# Patient Record
Sex: MALE | Race: BLACK OR AFRICAN AMERICAN | NOT HISPANIC OR LATINO | Employment: OTHER | URBAN - METROPOLITAN AREA
[De-identification: names, ages, dates, MRNs, and addresses within clinical notes are randomized per-mention and may not be internally consistent; named-entity substitution may affect disease eponyms.]

---

## 2018-04-26 DIAGNOSIS — M48.02 STENOSIS, CERVICAL SPINE: Primary | ICD-10-CM

## 2018-04-26 DIAGNOSIS — M48.061 SPINAL STENOSIS, LUMBAR: ICD-10-CM

## 2018-06-11 ENCOUNTER — CLINICAL SUPPORT (OUTPATIENT)
Dept: REHABILITATION | Facility: HOSPITAL | Age: 53
End: 2018-06-11
Payer: MEDICARE

## 2018-06-11 DIAGNOSIS — G89.29 CHRONIC BILATERAL LOW BACK PAIN WITHOUT SCIATICA: ICD-10-CM

## 2018-06-11 DIAGNOSIS — M53.86 DECREASED RANGE OF MOTION OF LUMBAR SPINE: ICD-10-CM

## 2018-06-11 DIAGNOSIS — M54.50 CHRONIC BILATERAL LOW BACK PAIN WITHOUT SCIATICA: ICD-10-CM

## 2018-06-11 DIAGNOSIS — M62.81 WEAKNESS OF TRUNK MUSCULATURE: ICD-10-CM

## 2018-06-11 PROCEDURE — G8978 MOBILITY CURRENT STATUS: HCPCS | Mod: CL,PN

## 2018-06-11 PROCEDURE — G8979 MOBILITY GOAL STATUS: HCPCS | Mod: CK,PN

## 2018-06-11 PROCEDURE — 97161 PT EVAL LOW COMPLEX 20 MIN: CPT | Mod: PN

## 2018-06-11 PROCEDURE — 97110 THERAPEUTIC EXERCISES: CPT | Mod: PN

## 2018-06-11 NOTE — PROGRESS NOTES
[facility] - PHYSICAL THERAPY EVALUATION     Name: Daniel Chapa  Clinic Number: [id number]    Daniel is a 53 y.o. male evaluated on 06/11/2018.   Time In: 8:00 am  Time out: 9:30 am    Diagnosis:   Encounter Diagnoses   Name Primary?    Chronic bilateral low back pain without sciatica     Weakness of trunk musculature     Decreased range of motion of lumbar spine      Physician: Ramos Lisa FNP  Treatment Orders: PT Eval and Treat    Past Medical History:   Diagnosis Date    Back pain     Diabetes mellitus     Hyperlipemia     Hyperlipidemia     Hypertension      Current Outpatient Prescriptions   Medication Sig    acyclovir 5% (ZOVIRAX) 5 % ointment Apply topically every 3 (three) hours.    aspirin 81 MG Chew Take 81 mg by mouth once daily.      hydrocodone-acetaminophen (LORTAB)  mg per tablet Take 1 tablet by mouth every 6 (six) hours as needed.      lactulose (CHRONULAC) 10 gram/15 mL solution Take by mouth 3 (three) times daily.    metformin (GLUCOPHAGE) 1000 MG tablet Take 1,000 mg by mouth 2 (two) times daily with meals.      oxycodone-acetaminophen (PERCOCET) 5-325 mg per tablet Take 1 tablet by mouth every 4 (four) hours as needed for Pain.    simvastatin (ZOCOR) 20 MG tablet Take 20 mg by mouth every evening.      valsartan (DIOVAN) 80 MG tablet Take 80 mg by mouth once daily.       No current facility-administered medications for this visit.      Review of patient's allergies indicates:  No Known Allergies  Precautions: None     Pattern of pain determined: 1 PEN  Visit # authorized:   Authorization period:   Plan of care Expiration: 9/11/2018  To Vendor Referred By By Location/POS By Department   none [facility] LOCATION ([facility]) [facility]   Priority Start Date Expiration Date Referral Entered By   Routine 04/26/2018 04/26/2019 Kamini Sellers   Visits Requested Visits Authorized Visits Completed Visits Scheduled   1 1 1 0            HISTORY   History of Present Illness: Chronic lower back pain. Onset over 10 to 15 years ago. Pt ambulates with lower back and cervical pain. Pt states back pain hurts more than neck pain. Pt would like to state work on lower back first. KOFI: Pt states he had an accident at work, which hurt  his lower back. Pt denies any bowel and bladder movement changes.Pt denies any fall.  Pt states lower back pain is localized cross lower back and radiates down to both lower extremity. Pt states pain radiates down his legs is intermittent.  Pt describe pain as aching and sharp pain. Pt states he wakes up at night because of his back pian.  Aggravating factors: standing too long, sitting down too long, lying down too long, bending forward, stairs., house shores. Easing Factors: pain medications.     Diagnostic Tests: From EPIC None    Pain Scale: Will rates pain on a scale of 0-10 to be 10 at worst; 6 currently; 4 at best using VAS.   Pain location: lower back and radiates down to B LE.     Aggravating factors: see above  Easing Factors: see above   Disturbed Sleep: Yes    Pattern of pain questions:  1.  Where is your pain the worst? Lower   2.  Is your pain constant or intermittent?   3.  Does bending forward make your typical pain worse? No  4.  Since the start of your back pain, has there been a change in your bowel or bladder? Yes  5.  What can't you do now that you use to be able to do?  granddaughter and house shores and sexual life.    Prior Treatment: Yes   Prior functional status: Independent   DME owned/used: No  Occupation: Retired   Leisure: Hang out with family            Pts goals:  Decrease lower back pain.     Red Flag Screening:   Cough  Sneeze  Strain: (--)  Bladder/ bowel: (--)  Falls: (--)  Night pain: (--)  Unexplained weight loss: (--)  General health: Left knee scope. HTN     OBJECTIVE     BMI: 45.61    Postural examination/scapula alignment: Rounded shoulder, Abnormal trunk flexion and  Slouched posture  Sitting: Rounded shoulder, Abnormal trunk flexion and Slouched posture  Standing: Rounded shoulder, Abnormal trunk flexion and Slouched posture  Correction of posture: better with lumbar roll    MOVEMENT LOSS    ROM Loss   Flexion major loss 22 degrees*   Extension major loss 10 degrees *   Side bending Right major loss 17 degrees *   Side bending Left major loss 19 degrees *   Rotation Right minimal loss*   Rotation Left minimal loss*   * Pain  Lower Extremity Strength  Right LE  Left LE    Hip flexion: 4+/5 Hip flexion: 4+/5   Hip extension:  4+/5 Hip extension: 4+/5   Hip abduction: 4+/5 Hip abduction: 4+/5   Hip adduction:  4+/5 Hip adduction:  4+/5   Hip Internal rotation   4+/5 Hip Internal rotation 4+/5   Knee Flexion 4+/5 Knee Flexion 4+/5   Knee Extension 4+/5 Knee Extension 4+/5   Ankle dorsiflexion: 4+/5 Ankle dorsiflexion: 4+/5   Ankle plantarflexion: 4+/5 Ankle plantarflexion: 4+/5       GAIT:  Assistive Device used: none  Level of Assistance: independent  Patient displays the following gait deviations:  no gait deviations observed.     Special Tests:   Test Name  Test Result   Prone Instability Test (--)   SI Joint Provocation Test (--)   Straight Leg Raise (+)   Neural Tension Test (+)   Crossed Straight Leg Raise (--)   Walking on toes (--)   Walking on heels  (--)       NEUROLOGICAL SCREENING     Sensory deficit: Intact lower extremity     Reflexes:    Left Right   Patella Tendon 2+ 2+   Achilles Tendon 2+ 2+   Babinski  (--) (--)   Clonus (--) (--)     REPEATED TEST MOVEMENTS:  Repeated Flexion in Standing pain during motion   Repeated Extension in Standing end range pain  pain during motion  worse   Repeated Flexion in lying pain during motion   Repeated Extension in lying  end range pain  pain during motion  worse       STATIC TESTS   Sitting slouched  pain during motion   Sitting erect pain during motion   Standing slouched pain during motion   Standing erect  pain during  motion   Lying prone in extension  end range pain  pain during motion   Long sitting   pain during motion       Baseline Isometric Testing on Med X equipment: Testing administered by PT  Date of testin2018   ROM 12-24 deg   Max Peak Torque 152   Min Peak Torque 112   Flex/Ext Ratio 0.1   % below normative data -40%   Counter weight 646   femur 5   Seat pad 0     CMS Impairment/Limitation/Restriction for FOTO Lumbar Spine Survey  Status Limitation G-Code CMS Severity Modifier  Intake 40% 60% Current Status CL - At least 60 percent but less than 80 percent  Predicted 52% 48% Goal Status+ CK - At least 40 percent but less than 60 percent  D/C Status CL **only report if this is a one time visit  +Based on FOTO predicted change score    Score interpretation is as follows:     TEST SCORE  Modifier  Impairment Limitation Restriction    0/50  CH  0 % impaired, limited or restricted   1-9/50  CI  @ least 1% but less than 20% impaired, limited or restricted   10-19/50  CJ  @ least 20%<40% impaired, limited or restricted   20-29/50  CK  @ least 40%<60% impaired, limited or restricted   30-39/50  CL  @ least 60% <80% impaired, limited or restricted   40-49/50  CM  @ least 80%<100% impaired limited or restricted   50/50  CN  100% impaired, limited or restricted       Treatment   Time In: 8:50  Time Out: 9:30    PT Evaluation Completed? Yes  Discussed Plan of Care with patient: Yes    [facility] 2018   Visit Number 1   VAS Pain Rating 6   Lumbar Extension Seat Pad 0   Femur Restraint 5   Top Dead Center 24   Counterweight 646   Lumbar Flexion 24   Lumbar Extension 12   Lumbar Peak Torque 152   Min Torque 112   Percent From Norm -40   Ice - Z Lie (in min.) 10         Home Exercise Program as follows:   Handouts were given to the patient. Pt demo good understanding of the education provided. Will demonstrated good return demonstration of activities.     - Patient received education regarding proper posture and  body mechanics.    - Johnny roll tried, recommended, and purchase information was provided.    - Patient received a handout regarding anticipated muscular soreness following the isometric test and strategies for management were reviewed with patient including stretching, using ice and scheduled rest.       Pt was instructed in and performed the following:     Will received therapeutic exercises to develop/improve posture, lumbar/cervical ROM, strength and muscular endurance for  minutes including the following exercises:   Lower trunk rotation  Seated trunk flexion  Single knee to chest    Assessment   This is a 53 y.o. male referred to [facility] and presents with a medical diagnosis of Chronic lower back pain without sciatica and demonstrates limitations as described below in the problem list. Pt rehab potential is Good minus. Pt presents with Chronic lower back pain for over 10 years. Pt presented with chronic lower back pain localized across lower back but intermittent radiating towards B LE. Pt presented with lower back pain increase in lumbar flexion and extension, but trunk extension pain is greater. Pt responded better to lumbar flexion. Pt presented with lower back pain limiting house shores, community life, and sexual life. Pt demonstrated decrease lumbar range of motion and decrease in lumbar extensors musculature. Pt was positive in slump test today. Pt also was positive in SLR test but LE radiating pain is intermittent throughout day. Medx lumbar extension demonstrated decrease in lumbar extensor muscle strength with Max peak torque of 152 and Min peak torque of 112. Pt has Lumbar range of motion of 12 to 24 degrees with lower back pain during motion. Pt did experience lower back pain during Isometric Medx lumbar extension. Pt will benefit from skilled PT services to decrease functional limitations.     Pain Pattern: 1 PEN       Patient received education on the [facility] program, purpose  of the isometric test, progression of back strengthening as well as wellness approach and systemic strengthening.  Details of the program were discussed.  Reviewed that patient should feel support/pressure from med ex restraints but no pain or discomfort and patient expressed understanding.    Based on the above history and physical examination an active physical therapy program is recommended.  Pt will continue to benefit from skilled outpatient physical therapy to address the deficits listed below in the chart, provide pt/family education and to maximize pt's level of independence in the home and community environment. .     No environmental, cultural, spiritual, developmental or education needs expressed or noted    Medical necessity is demonstrated by the following problem list.    Pt presents with the following impairments:   History  Co-morbidities and personal factors that may impact the plan of care Examination  Body Structures and Functions, activity limitations and participation restrictions that may impact the plan of care Clinical Presentation   Decision Making/ Complexity Score   Co-morbidities:   high BMI        Personal Factors:   no deficits Body Regions:   back    Body Systems:   ROM  strength  transfers  transitions  motor control    Activity limitations:   Learning and applying knowledge  no deficits    General Tasks and Commands  no deficits    Communication  no deficits    Mobility  lifting and carrying objects  walking    Self care  no deficits    Domestic Life  doing house work (cleaning house, washing dishes, laundry)    Interactions/Relationships  no deficits    Life Areas  no deficits      Participation Restrictions:   Community life  recreation and leisure       stable and uncomplicated   Complexity: low  See FOTO outcome assessment above          GOALS: Pt is in agreement with the following goals.    Short term goals:  6 weeks or 10 visits   1.  Pt will demonstrate increased lumbar ROM by  "at least 3 degrees from the initial ROM value with improvements noted in functional ROM and ability to perform ADLs  2.  Pt will demonstrate increased maximum isometric torque value by 5% when compared to the initial value resulting in improved ability to perform bending, lifting, and carrying activities safely, confidently.  3.  Patient report a reduction in worst pain score by 1-2 points for improved tolerance during work and recreational activities  4.  Pt able to perform HEP correctly with minimal cueing or supervision for therapist      Long term goals: 13 weeks or 20 visits   1. Pt will demonstrate increased lumbar ROM by at least 10 degrees from initial ROM value, resulting in improved ability to perform functional fwd bending while standing and sitting.   2. Pt will demonstrate increased maximum isometric torque value by 10% when compared to the initial value resulting in improved ability to perform bending, lifting, and carrying activities safely, confidently.  3. Pt to demonstrate ability to independently control and reduce their pain through posture positioning and mechanical movements throughout a typical day.  4.  Patient will demonstrate improved overall function per FOTO Survey to CK = at least 40% but < 60% impaired, limited or restricted score or less.      Plan   Outpatient physical therapy 2x week for 13 weeks or 20 visits to include the following:   - Patient education  - Therapeutic exercise  - Manual therapy  - Performance testing   - Neuromuscular Re-education  - Therapeutic activity   - Modalities  -Functional dry needling     Pt may be seen by PTA as part of the rehabilitation team.     Therapist: Mikal Alexander, PT  6/11/2018    "I certify the need for these services furnished under this plan of treatment and while under my care."    ____________________________________  Physician/Referring Practitioner    _______________  Date of Signature                 "

## 2018-06-15 NOTE — PROGRESS NOTES
[facility] Physical Therapy Treatment      Name: Daniel Chapa  Clinic Number: [id number]  Diagnosis:   Encounter Diagnoses   Name Primary?    Chronic bilateral low back pain without sciatica     Weakness of trunk musculature     Decreased range of motion of lumbar spine      Physician: Ramos Lisa FNP  Precautions: Standard   Visit #: 2 of 12  COBURN: 2018  PTA Visit #: 0    Time In: 8:30 am  Time Out: 9:35 am  Total Treatment Time: 65  mins (1:1 with PT for 30 mins)    Pain Pattern: 1 PEN  Date POC Signed: Fax to [facility]        Subjective     Will reports worsening of symptoms.  Pt states that he has severe pain in right and left lower back.     Patient reports their pain to be 6 out of 10 on a 0-10 scale with 0 being no pain and 10 being the worst pain imaginable.    Pain Location: lower back      Objective     Baseline Isometric Testing on Med X equipment: Testing administered by PT  Date of testin2018   ROM 12-24 deg   Max Peak Torque 152   Min Peak Torque 112   Flex/Ext Ratio 0.1   % below normative data -40%   Counter weight 646   femur 5   Seat pad 0        Treatment      Will received individual therapeutic exercises to develop strength, endurance, ROM, flexibility, posture and core stabilization for 55 minutes including:      [facility] 2018   Visit Number 2   VAS Pain Rating 6   Treadmill Time (in min.) 10   Speed 1   Incline 0   Lumbar Extension Seat Pad -   Femur Restraint -   Top Dead Center -   Counterweight -   Lumbar Flexion 24   Lumbar Extension 12   Lumbar Peak Torque -   Min Torque -   Percent From Norm -   Lumbar Weight 45   Repetitions 15   Rating of Perceived Exertion 5   Ice - Z Lie (in min.) 10     Seated trunk flexion 1x10  SKTC 3x30''   LTR1x2'    Peripheral muscle strengthening which included 1 set of 15-20 repetitions at a slow, controlled 7 second per rep pace focused on strengthening supporting musculature for improved body mechanics  and functional mobility. Pt and therapist focused on proper form during treatment to ensure optimal strengthening of each targeted muscle group. Machines were utilized including torso rotation, leg extension, leg curl, chest press, upright row, tricep extension, bicep curl, leg press, and hip abduction.  Lower extremity exercises not performed today.     Will received the following manual therapy techniques: Soft tissue Mobilization were applied to the: N/a for  00 minutes including:    Kinesio tape applied to lower back with Biofreeze  For decrease pain  using T-strip . Patient received education regarding appropriate care and removal of Kinesiotape. Patient instructed in proper removal techniques if skin irritation occurs.       Written Home Exercises Provided:   Pt demo good understanding of the education provided. Will demonstrated good return demonstration of activities.     Education provided re:   Will verbalized good understanding of education provided.   No spiritual or educational barriers to learning provided    Lumbar roll use compliance: better with lumbar roll    Assessment     Patient tolerated therapy Fair today. Pt presented with severe right and left lower back pain. Pt's pain increase with side bending to right side. Pt tolerated initial medx lumbar extension Fair with ROM of 12-24 degrees, weight of 45 ft lbs,15 reps and RPE of 5. Pt did demonstrated physical expression of pain. Pt tolerated initial peripheral muscle strengthening Fair today. Pt cont demonstrated pain in B lower back. Pt tolerates all upper extremity exercises with 15 reps and RPE of 5,6, or 7. Lower extremity exercises were not performed today. Taping technique with Biofreeze was applied in lower back to help decrease pain. Cont skilled PT services to achieve PT and patient's goals.   This is a 53 y.o. male referred to outpatient physical therapy and presents with a medical diagnosis of Chronic lower back pain without sciatica   and demonstrates limitations as described in the problem list. Pt prognosis is Good minus.Pt will continue to benefit from skilled outpatient physical therapy to address the deficits listed in the problem list, provide pt/family education and to maximize pt's level of independence in the home and community environment.     Goals as follows:  Short term goals:  6 weeks or 10 visits   1.  Pt will demonstrate increased lumbar ROM by at least 3 degrees from the initial ROM value with improvements noted in functional ROM and ability to perform ADLs  2.  Pt will demonstrate increased maximum isometric torque value by 5% when compared to the initial value resulting in improved ability to perform bending, lifting, and carrying activities safely, confidently.  3.  Patient report a reduction in worst pain score by 1-2 points for improved tolerance during work and recreational activities  4.  Pt able to perform HEP correctly with minimal cueing or supervision for therapist        Long term goals: 13 weeks or 20 visits   1. Pt will demonstrate increased lumbar ROM by at least 10 degrees from initial ROM value, resulting in improved ability to perform functional fwd bending while standing and sitting.   2. Pt will demonstrate increased maximum isometric torque value by 10% when compared to the initial value resulting in improved ability to perform bending, lifting, and carrying activities safely, confidently.  3. Pt to demonstrate ability to independently control and reduce their pain through posture positioning and mechanical movements throughout a typical day.  4.  Patient will demonstrate improved overall function per FOTO Survey to CK = at least 40% but < 60% impaired, limited or restricted score or less.       Plan   Plan to schedule more visits.    Continue with established Plan of Care towards established PT goals.     Certification Period:6/11/2018 to 9/11/2018    Therapist: Mikal Alexander, PT  06/18/2018

## 2018-06-18 ENCOUNTER — CLINICAL SUPPORT (OUTPATIENT)
Dept: REHABILITATION | Facility: HOSPITAL | Age: 53
End: 2018-06-18
Payer: MEDICARE

## 2018-06-18 DIAGNOSIS — G89.29 CHRONIC BILATERAL LOW BACK PAIN WITHOUT SCIATICA: ICD-10-CM

## 2018-06-18 DIAGNOSIS — M54.50 CHRONIC BILATERAL LOW BACK PAIN WITHOUT SCIATICA: ICD-10-CM

## 2018-06-18 DIAGNOSIS — M62.81 WEAKNESS OF TRUNK MUSCULATURE: ICD-10-CM

## 2018-06-18 DIAGNOSIS — M53.86 DECREASED RANGE OF MOTION OF LUMBAR SPINE: ICD-10-CM

## 2018-06-18 PROCEDURE — 97110 THERAPEUTIC EXERCISES: CPT | Mod: PN

## 2018-06-21 ENCOUNTER — CLINICAL SUPPORT (OUTPATIENT)
Dept: REHABILITATION | Facility: HOSPITAL | Age: 53
End: 2018-06-21
Payer: MEDICARE

## 2018-06-21 DIAGNOSIS — M53.86 DECREASED RANGE OF MOTION OF LUMBAR SPINE: ICD-10-CM

## 2018-06-21 DIAGNOSIS — G89.29 CHRONIC BILATERAL LOW BACK PAIN WITHOUT SCIATICA: ICD-10-CM

## 2018-06-21 DIAGNOSIS — M62.81 WEAKNESS OF TRUNK MUSCULATURE: ICD-10-CM

## 2018-06-21 DIAGNOSIS — M54.50 CHRONIC BILATERAL LOW BACK PAIN WITHOUT SCIATICA: ICD-10-CM

## 2018-06-21 PROCEDURE — 97110 THERAPEUTIC EXERCISES: CPT | Mod: PN | Performed by: PHYSICAL MEDICINE & REHABILITATION

## 2018-06-21 NOTE — PATIENT INSTRUCTIONS
Backward Bend (Standing)        Arch backward to make hollow of back deeper. Hold __3__ seconds.  Repeat __10__ times per set. Do ____ sets per session. Do _2___ sessions per day.     [email or web address]

## 2018-06-21 NOTE — PROGRESS NOTES
[facility]      Name: Daniel Chapa  Clinic Number: [id number]  Diagnosis:   Encounter Diagnoses   Name Primary?    Chronic bilateral low back pain without sciatica     Weakness of trunk musculature     Decreased range of motion of lumbar spine      Physician: Ramos Lisa FNP  Precautions: Standard   Visit #: 3 of 12  COBURN: 2018  PTA Visit #: 0    Time In: 8:30 am  Time Out: 9:35 am  Total Treatment Time: 65  mins (1:1 with PT for 30 mins)    Pain Pattern: 1 PEN  Date POC Signed: Fax to KEYUR Juarez        Subjective     Will reports he was sore after his last visit for a day. He attends with 7/10 back pain, 4/10 after visit.  He initially appeared fearful of movement and we discussed the traffic light model of exercise, stressing we were looking for green lights,movements that made his pain better, but yellow lights, discomfort or stretching with no worsening of symptoms after is ok.  He appeared to understand this and be more comfortable moving.   He had less pain after visit. He liked the tape and we reapplied it today.          Patient reports their pain to be 6 out of 10 on a 0-10 scale with 0 being no pain and 10 being the worst pain imaginable.    Pain Location: lower back      Objective     Baseline Isometric Testing on Med X equipment: Testing administered by PT  Date of testin2018   ROM 12-24 deg   Max Peak Torque 152   Min Peak Torque 112   Flex/Ext Ratio 0.1   % below normative data -40%   Counter weight 646   femur 5   Seat pad 0        Treatment      Will received individual therapeutic exercises to develop strength, endurance, ROM, flexibility, posture and core stabilization for 55 minutes including:      Seated trunk flexion 1x10  SKTC 3x30''   LTR1x2'    Gentle ext in standing without increase in pain, added to HEP      [facility] 2018   Visit Number 3   VAS Pain Rating 7   Treadmill Time (in min.) 10   Speed 1.5   Incline -    Extension in Standing 10   Flexion in Lying 10   Lumbar Extension Seat Pad -   Femur Restraint -   Top Dead Center -   Counterweight -   Lumbar Flexion -   Lumbar Extension -   Lumbar Peak Torque -   Min Torque -   Percent From Norm -   Lumbar Weight 45   Repetitions 20   Rating of Perceived Exertion 6   Ice - Z Lie (in min.) 10         Peripheral muscle strengthening which included 1 set of 15-20 repetitions at a slow, controlled 7 second per rep pace focused on strengthening supporting musculature for improved body mechanics and functional mobility. Pt and therapist focused on proper form during treatment to ensure optimal strengthening of each targeted muscle group. Machines were utilized including torso rotation, leg extension, leg curl, chest press, upright row, tricep extension, bicep curl, leg press, and hip abduction.  Lower extremity exercises not performed today.     Will received the following manual therapy techniques: Soft tissue Mobilization were applied to the: N/a for  00 minutes including:    Kinesio tape applied to lower back with Biofreeze  For decrease pain  using T-strip . Patient received education regarding appropriate care and removal of Kinesiotape. Patient instructed in proper removal techniques if skin irritation occurs.       Written Home Exercises Provided:   Double knees to chest  Lower trunk rotation  Seated trunk flexion  Single knee to chest   ext in standing  Pt demo good understanding of the education provided. Will demonstrated good return demonstration of activities.     Education provided re:   Will verbalized good understanding of education provided.   No spiritual or educational barriers to learning provided    Lumbar roll use compliance: better with lumbar roll but not using, encouraged  HEP additions today:  Gentle ext in standing without increase in pain, added to HEP        Assessment     Patient tolerated therapy Fair today. Pt presented with  right and left lower back  pain. Pt's pain increase with side bending to right side but didn't stay worse.  Educated him in green lights and we are looking for movements and positions which reduce pain, which flexion seems to assist slightly.  No worse with gentle ext in standing which was added to improve mobility and function. Pt tolerated initial medx lumbar extension Fair with ROM of 12-24 degrees, weight of 45 ft lbs, 20 reps and RPE of 6. Pt did not indicate pain today on med ex and seemed more comfortable moving, when discussed that motion is normal, emphasizing that we want to move in ranges that don't increase symptoms after.   He appeared to understand and relax, will pay attention to this in future visits as he appears to have some fear regarding movement.   Pt tolerated initial peripheral muscle strengthening Fair today. Pt cont demonstrated pain in B lower back.  Taping technique with Biofreeze was applied in lower back to help decrease pain. Cont skilled PT services to achieve PT and patient's goals.   This is a 53 y.o. male referred to outpatient physical therapy and presents with a medical diagnosis of Chronic lower back pain without sciatica  and demonstrates limitations as described in the problem list. Pt prognosis is Good minus.Pt will continue to benefit from skilled outpatient physical therapy to address the deficits listed in the problem list, provide pt/family education and to maximize pt's level of independence in the home and community environment.     Goals as follows:  Short term goals:  6 weeks or 10 visits   1.  Pt will demonstrate increased lumbar ROM by at least 3 degrees from the initial ROM value with improvements noted in functional ROM and ability to perform ADLs  2.  Pt will demonstrate increased maximum isometric torque value by 5% when compared to the initial value resulting in improved ability to perform bending, lifting, and carrying activities safely, confidently.  3.  Patient report a reduction in  worst pain score by 1-2 points for improved tolerance during work and recreational activities  4.  Pt able to perform HEP correctly with minimal cueing or supervision for therapist        Long term goals: 13 weeks or 20 visits   1. Pt will demonstrate increased lumbar ROM by at least 10 degrees from initial ROM value, resulting in improved ability to perform functional fwd bending while standing and sitting.   2. Pt will demonstrate increased maximum isometric torque value by 10% when compared to the initial value resulting in improved ability to perform bending, lifting, and carrying activities safely, confidently.  3. Pt to demonstrate ability to independently control and reduce their pain through posture positioning and mechanical movements throughout a typical day.  4.  Patient will demonstrate improved overall function per FOTO Survey to CK = at least 40% but < 60% impaired, limited or restricted score or less.       Plan   Plan to schedule more visits.    Continue with established Plan of Care towards established PT goals.     Certification Period:6/11/2018 to 9/11/2018    Therapist: Arianna Chase, PT  06/18/2018

## 2018-06-28 ENCOUNTER — CLINICAL SUPPORT (OUTPATIENT)
Dept: REHABILITATION | Facility: HOSPITAL | Age: 53
End: 2018-06-28
Payer: MEDICARE

## 2018-06-28 DIAGNOSIS — M62.81 WEAKNESS OF TRUNK MUSCULATURE: ICD-10-CM

## 2018-06-28 DIAGNOSIS — G89.29 CHRONIC BILATERAL LOW BACK PAIN WITHOUT SCIATICA: ICD-10-CM

## 2018-06-28 DIAGNOSIS — M53.86 DECREASED RANGE OF MOTION OF LUMBAR SPINE: ICD-10-CM

## 2018-06-28 DIAGNOSIS — M54.50 CHRONIC BILATERAL LOW BACK PAIN WITHOUT SCIATICA: ICD-10-CM

## 2018-06-28 PROCEDURE — 97140 MANUAL THERAPY 1/> REGIONS: CPT | Mod: PN

## 2018-06-28 PROCEDURE — 97110 THERAPEUTIC EXERCISES: CPT | Mod: PN

## 2018-06-28 NOTE — PROGRESS NOTES
" [facility]      Name: Daniel Chapa  Clinic Number: [id number]  Diagnosis:   Encounter Diagnoses   Name Primary?    Chronic bilateral low back pain without sciatica     Weakness of trunk musculature     Decreased range of motion of lumbar spine      Physician: Ramos Lisa FNP  Precautions: Standard   Visit #: 4 of 12  COBURN: 2018  PTA Visit #: 0    Time In: 8:30 am  Time Out: 9:35 am  Total Treatment Time: 65  mins (1:1 with PT for 40 mins)    Pain Pattern: 1 PEN  Date POC Signed: Fax to KEYUR Juarez      Subjective     Will reports that he has been doing his HEP daily but notes some relief of symptoms afterwards. "I really like the tape. Can I get it again? It helps when it's on, but I hurt more when it's off."    Patient reports their pain to be 5 out of 10 on a 0-10 scale with 0 being no pain and 10 being the worst pain imaginable.    Pain Location: lower back      Objective     Baseline Isometric Testing on Med X equipment: Testing administered by PT  Date of testin2018   ROM 12-24 deg   Max Peak Torque 152   Min Peak Torque 112   Flex/Ext Ratio 0.1   % below normative data -40%   Counter weight 646   femur 5   Seat pad 0        Treatment      Will received individual therapeutic exercises to develop strength, endurance, ROM, flexibility, posture and core stabilization for 55 minutes including:    Seated trunk flexion 1x10  SKTC 3x30''   LTR1x2'    Gentle ext in standing without increase in pain, added to HEP    [facility] 2018   Visit Number 3   VAS Pain Rating 5   Treadmill Time (in min.) 10   Speed 1.5   Incline -   Extension in Standing 10   Flexion in Lying 10   Manual Therapy 10 - see note below   Lumbar Weight 45   Repetitions 20   Rating of Perceived Exertion 6.5   Ice - Z Lie (in min.) 10       Peripheral muscle strengthening which included 1 set of 15-20 repetitions at a slow, controlled 7 second per rep pace focused on " "strengthening supporting musculature for improved body mechanics and functional mobility. Pt and therapist focused on proper form during treatment to ensure optimal strengthening of each targeted muscle group. Machines were utilized including torso rotation, leg extension, leg curl, chest press, upright row, tricep extension, bicep curl, leg press, and hip abduction.  Lower extremity exercises not performed today.     Will received the following manual therapy techniques: 10 min x preparation and application of kinesiotape for pain reduction, muscle inhibition (LSP paraspinals, QL), and improvement of MF mobility. Used 4 I-strips for paraspinal and QLs bilaterally. Patient received education regarding appropriate care and removal of Kinesiotape. Patient instructed in proper removal techniques if skin irritation occurs. Taping concurrent with Biofreeze application for pain control.      Written Home Exercises Provided:   Double knees to chest  Lower trunk rotation  Seated trunk flexion  Single knee to chest   ext in standing  Pt demo good understanding of the education provided. Will demonstrated good return demonstration of activities.     Education provided re:   Will verbalized good understanding of education provided.   No spiritual or educational barriers to learning provided    Lumbar roll use compliance: better with lumbar roll but not using, encouraged  HEP additions today:  Gentle ext in standing without increase in pain, added to HEP        Assessment     Minimal change to theres program today, including resisted trunk extension, due to c/o marked low back pain. Pt reports intense "pulling" in bilateral lower back (6/10 on RPE scale) during trunk extension in modified ROM (12-24 deg). Pt presents with flat back posture, with poor MF mobility during trunk flexion, with associated c/o increased pain. Pt would be a good candidate for MF release using cupping. Heavy education today on importance of performing " HEP frequently throughout the day to reduce c/o pain and improve functional mobility. Pt verbalized understanding. Pt cont demonstrated pain in B lower back.  Taping technique with Biofreeze was applied in lower back to help decrease pain. Cont skilled PT services to achieve PT and patient's goals.     This is a 53 y.o. male referred to outpatient physical therapy and presents with a medical diagnosis of Chronic lower back pain without sciatica  and demonstrates limitations as described in the problem list. Pt prognosis is Good minus.Pt will continue to benefit from skilled outpatient physical therapy to address the deficits listed in the problem list, provide pt/family education and to maximize pt's level of independence in the home and community environment.     Goals as follows:  Short term goals:  6 weeks or 10 visits   1.  Pt will demonstrate increased lumbar ROM by at least 3 degrees from the initial ROM value with improvements noted in functional ROM and ability to perform ADLs  2.  Pt will demonstrate increased maximum isometric torque value by 5% when compared to the initial value resulting in improved ability to perform bending, lifting, and carrying activities safely, confidently.  3.  Patient report a reduction in worst pain score by 1-2 points for improved tolerance during work and recreational activities  4.  Pt able to perform HEP correctly with minimal cueing or supervision for therapist        Long term goals: 13 weeks or 20 visits   1. Pt will demonstrate increased lumbar ROM by at least 10 degrees from initial ROM value, resulting in improved ability to perform functional fwd bending while standing and sitting.   2. Pt will demonstrate increased maximum isometric torque value by 10% when compared to the initial value resulting in improved ability to perform bending, lifting, and carrying activities safely, confidently.  3. Pt to demonstrate ability to independently control and reduce their pain  through posture positioning and mechanical movements throughout a typical day.  4.  Patient will demonstrate improved overall function per FOTO Survey to CK = at least 40% but < 60% impaired, limited or restricted score or less.       Plan   Plan to schedule more visits.    Continue with established Plan of Care towards established PT goals.     Certification Period:6/11/2018 to 9/11/2018    Therapist: Delfina Platt, PT  06/18/2018

## 2018-07-11 ENCOUNTER — DOCUMENTATION ONLY (OUTPATIENT)
Dept: REHABILITATION | Facility: HOSPITAL | Age: 53
End: 2018-07-11

## 2018-07-11 NOTE — PROGRESS NOTES
Patient was a no show to today's PT visit. Patient's next scheduled appointment is 7/13/2018.    Cancel: 0  No show: 1    Therapist: Joy Chapin, THEO  07/11/2018

## 2018-07-13 ENCOUNTER — CLINICAL SUPPORT (OUTPATIENT)
Dept: REHABILITATION | Facility: HOSPITAL | Age: 53
End: 2018-07-13
Payer: MEDICARE

## 2018-07-13 DIAGNOSIS — G89.29 CHRONIC BILATERAL LOW BACK PAIN WITHOUT SCIATICA: ICD-10-CM

## 2018-07-13 DIAGNOSIS — M53.86 DECREASED RANGE OF MOTION OF LUMBAR SPINE: ICD-10-CM

## 2018-07-13 DIAGNOSIS — M62.81 WEAKNESS OF TRUNK MUSCULATURE: ICD-10-CM

## 2018-07-13 DIAGNOSIS — M54.50 CHRONIC BILATERAL LOW BACK PAIN WITHOUT SCIATICA: ICD-10-CM

## 2018-07-13 PROCEDURE — 97110 THERAPEUTIC EXERCISES: CPT | Mod: PN

## 2018-07-13 NOTE — PROGRESS NOTES
[facility]      Name: Daniel Chapa  Clinic Number: [id number]  Diagnosis:   Encounter Diagnoses   Name Primary?    Chronic bilateral low back pain without sciatica     Weakness of trunk musculature     Decreased range of motion of lumbar spine      Physician: Ramos Lisa FNP  Precautions: Standard   Visit #: 5  12  COBURN: 2018  PTA Visit #: 1    Time In: 0930  Time Out: 1030  Total Treatment Time: 50 mins (1:1 with PTA for 30 mins)    Pain Pattern: 1 PEN  Date POC Signed: Fax to KEYUR Juarez    Subjective     Will reports that the tape was helping his pain, but that it came off quickly.    Patient reports their pain to be 5 out of 10 on a 0-10 scale with 0 being no pain and 10 being the worst pain imaginable.    Pain Location: lower back    Objective     Baseline Isometric Testing on Med X equipment: Testing administered by PT  Date of testin2018   ROM 12-24 deg   Max Peak Torque 152   Min Peak Torque 112   Flex/Ext Ratio 0.1   % below normative data -40%   Counter weight 646   femur 5   Seat pad 0        Treatment      Will received individual therapeutic exercises to develop strength, endurance, ROM, flexibility, posture and core stabilization for 50 minutes including:    Seated trunk flexion: 10x  SKTC: 3 x 30''   LTR: 10x  ZANE on SB: 10x  Gentle ext in standing x 10     [facility] 2018   Visit Number 5   VAS Pain Rating 5   Treadmill Time (in min.) 10   Speed 2   Incline 0   Extension in Standing 10   Flexion in Lying 10   Flexion in Sitting 10   Manual Therapy 18   Lumbar Weight 45   Repetitions 15   Rating of Perceived Exertion 7   Ice - Z Lie (in min.) 10     Peripheral muscle strengthening which included 1 set of 15-20 repetitions at a slow, controlled 7 second per rep pace focused on strengthening supporting musculature for improved body mechanics and functional mobility. Pt and therapist focused on proper form during treatment to  "ensure optimal strengthening of each targeted muscle group. Machines were utilized including torso rotation, leg extension, leg curl, chest press, upright row, tricep extension, bicep curl, leg press, and hip abduction.  Machines not performed today. See note.    Will received the following manual therapy techniques:   10 min x Vacuum/cupping STM with manual therapy techniques was performed to Right lumbar paraspinals to decrease muscle tightness, increase circulation and promote healing process. The pt's skin was monitored for redness adjusting pressure as needed. The pt was instructed in possible side effects of bruising and/or soreness.   8 min x Rock tape applied to Bilateral lumbar paraspinals for muscle inhibition using 2 - 4" I-strips. Patient received education regarding appropriate care and removal of Kinesiotape. Patient instructed in proper removal techniques if skin irritation occurs. Biofreeze applied to Rock tape for pain reduction.    Written Home Exercises Provided:   Double knees to chest  Lower trunk rotation  Seated trunk flexion  Single knee to chest   ext in standing  Pt demo good understanding of the education provided. Will demonstrated good return demonstration of activities.     Education provided re:   Will verbalized good understanding of education provided.   No spiritual or educational barriers to learning provided    Lumbar roll use compliance: better with lumbar roll but not using, encouraged  HEP additions today:  Gentle ext in standing without increase in pain, added to HEP    Assessment     Patient tolerated treatment session fair today. Patient arrived to session complaining of increased low back pain, Right side > Left. Patient with good tolerance to manual care, however reported no change in subjective complaints. Patient was able to complete his stretches reporting increased pain with visible discomfort observed. Patient was performing [employer] Lumbar Extension machine when he " became emotional and stopped the exercise explaining that he was in a lot of pain and would be unable to continue. Session was ended and patient received ice to his low back for pain reduction in a z-lying position. Patient was encouraged to continue performing his stretches at home for self-management of pain, patient verbal agreement. Cont skilled PT services to achieve PT and patient's goals.     This is a 53 y.o. male referred to outpatient physical therapy and presents with a medical diagnosis of Chronic lower back pain without sciatica  and demonstrates limitations as described in the problem list. Pt prognosis is Good minus.Pt will continue to benefit from skilled outpatient physical therapy to address the deficits listed in the problem list, provide pt/family education and to maximize pt's level of independence in the home and community environment.     Goals as follows:  Short term goals:  6 weeks or 10 visits   1.  Pt will demonstrate increased lumbar ROM by at least 3 degrees from the initial ROM value with improvements noted in functional ROM and ability to perform ADLs  2.  Pt will demonstrate increased maximum isometric torque value by 5% when compared to the initial value resulting in improved ability to perform bending, lifting, and carrying activities safely, confidently.  3.  Patient report a reduction in worst pain score by 1-2 points for improved tolerance during work and recreational activities  4.  Pt able to perform HEP correctly with minimal cueing or supervision for therapist     Long term goals: 13 weeks or 20 visits   1. Pt will demonstrate increased lumbar ROM by at least 10 degrees from initial ROM value, resulting in improved ability to perform functional fwd bending while standing and sitting.   2. Pt will demonstrate increased maximum isometric torque value by 10% when compared to the initial value resulting in improved ability to perform bending, lifting, and carrying activities  safely, confidently.  3. Pt to demonstrate ability to independently control and reduce their pain through posture positioning and mechanical movements throughout a typical day.  4.  Patient will demonstrate improved overall function per FOTO Survey to CK = at least 40% but < 60% impaired, limited or restricted score or less.    Plan     Continue with established Plan of Care towards established PT goals.     Certification Period:6/11/2018 to 9/11/2018    Therapist: Joy Chapin, PTA  07/13/2018

## 2018-07-16 ENCOUNTER — CLINICAL SUPPORT (OUTPATIENT)
Dept: REHABILITATION | Facility: HOSPITAL | Age: 53
End: 2018-07-16
Payer: MEDICARE

## 2018-07-16 DIAGNOSIS — G89.29 CHRONIC BILATERAL LOW BACK PAIN WITHOUT SCIATICA: ICD-10-CM

## 2018-07-16 DIAGNOSIS — M54.50 CHRONIC BILATERAL LOW BACK PAIN WITHOUT SCIATICA: ICD-10-CM

## 2018-07-16 DIAGNOSIS — M62.81 WEAKNESS OF TRUNK MUSCULATURE: ICD-10-CM

## 2018-07-16 DIAGNOSIS — M53.86 DECREASED RANGE OF MOTION OF LUMBAR SPINE: ICD-10-CM

## 2018-07-16 PROCEDURE — 97110 THERAPEUTIC EXERCISES: CPT | Mod: PN | Performed by: PHYSICAL MEDICINE & REHABILITATION

## 2018-07-16 NOTE — PROGRESS NOTES
[facility]      Name: Daniel Chapa  Clinic Number: [id number]  Diagnosis:   Encounter Diagnoses   Name Primary?    Chronic bilateral low back pain without sciatica     Weakness of trunk musculature     Decreased range of motion of lumbar spine      Physician: Ramos Lisa FNP  Precautions: Standard   Visit #:   COBURN: 2018  PTA Visit #: 1    Time In: 0930  Time Out: 1030  Total Treatment Time: 50 mins (1:1 with PTA for 30 mins)    Pain Pattern: 1 PEN  Date POC Signed: Fax to KEYUR Juarez    Subjective     Will reports that the tape was helping his pain, he is not stretching every day but trying to be more consistent.  Reminded him to take ownership of symptoms, motion is lotion, stretch daily and watch posture.  Pain 5/10 back pre treatment, 2/10 post treatment, reminded him how he felt better after moving and to continue this.    Patient reports their pain to be 5 out of 10 on a 0-10 scale with 0 being no pain and 10 being the worst pain imaginable.    Pain Location: lower back    Objective     MOVEMENT LOSS     ROM Loss   Flexion major loss 22 degrees*  Improved to mod loss   Extension major loss 10 degrees * improved to mod loss   Side bending Right major loss 17 degrees *   Side bending Left major loss 19 degrees *   Rotation Right minimal loss*   Rotation Left minimal loss*       Baseline Isometric Testing on Med X equipment: Testing administered by PT  Date of testin2018   ROM 12-24 deg   Max Peak Torque 152   Min Peak Torque 112   Flex/Ext Ratio 0.1   % below normative data -40%   Counter weight 646   femur 5   Seat pad 0        CMS Impairment/Limitation/Restriction for FOTO Lumbar Spine Survey  Status Limitation G-Code CMS Severity Modifier  Intake  60% Current Status CL - At least 60 percent but less than 80 percent  Goal CK      Treatment      Will received individual therapeutic exercises to develop strength, endurance, ROM, flexibility,  "posture and core stabilization for 50 minutes including:    Seated trunk flexion: 10x  SKTC: 3 x 30''   LTR: 10x  ZANE on SB: 10x  Gentle ext in standing x 10       [facility] 7/16/2018   Visit Number 6   VAS Pain Rating 5   Treadmill Time (in min.) 10   Speed 2   Incline -   Extension in Standing 10   Flexion in Lying 10   Flexion in Sitting 10   Manual Therapy 10   Lumbar Extension Seat Pad -   Femur Restraint -   Top Dead Center -   Counterweight -   Lumbar Flexion 30   Lumbar Extension 6   Lumbar Peak Torque -   Min Torque -   Percent From Norm -   Lumbar Weight 45   Repetitions 17   Rating of Perceived Exertion 6   Ice - Z Lie (in min.) 10           Peripheral muscle strengthening which included 1 set of 15-20 repetitions at a slow, controlled 7 second per rep pace focused on strengthening supporting musculature for improved body mechanics and functional mobility. Pt and therapist focused on proper form during treatment to ensure optimal strengthening of each targeted muscle group. Machines were utilized including torso rotation, leg extension, leg curl, chest press, upright row, tricep extension, bicep curl, leg press, and hip abduction.  Machines not performed today. See note.    Will received the following manual therapy techniques:   10 min x Vacuum/cupping STM with manual therapy techniques was performed to Right lumbar paraspinals to decrease muscle tightness, increase circulation and promote healing process. The pt's skin was monitored for redness adjusting pressure as needed. The pt was instructed in possible side effects of bruising and/or soreness.   8 min x Rock tape applied to Bilateral lumbar paraspinals for muscle inhibition using 2 - 4" I-strips. Patient received education regarding appropriate care and removal of Kinesiotape. Patient instructed in proper removal techniques if skin irritation occurs. Biofreeze applied to Rock tape for pain reduction.    Written Home Exercises Provided: "   Double knees to chest  Lower trunk rotation  Seated trunk flexion  Single knee to chest   ext in standing  Pt demo good understanding of the education provided. Will demonstrated good return demonstration of activities.     Education provided re:   Will verbalized good understanding of education provided.   No spiritual or educational barriers to learning provided    Lumbar roll use compliance: better with lumbar roll but not using, encouraged  HEP additions today:  Gentle ext in standing without increase in pain, added to HEP    Assessment     Patient tolerated treatment session fair today.  Assessment demonstrates slowly improving ROM.  He responds well to taping and cupping.  Encouraged improved compliance with stretching. Patient arrived to session complaining of increased low back pain, Right side > Left. Patient with good tolerance to manual care.  He tolerated improved ROM on med ex and responded well to exercise today, but requires cuing that motion is normal and good, and encouragement to be compliant with exercise.  Patient was encouraged to continue performing his stretches at home for self-management of pain, patient verbal agreement. Cont skilled PT services to achieve PT and patient's goals.     This is a 53 y.o. male referred to outpatient physical therapy and presents with a medical diagnosis of Chronic lower back pain without sciatica  and demonstrates limitations as described in the problem list. Pt prognosis is Good minus.Pt will continue to benefit from skilled outpatient physical therapy to address the deficits listed in the problem list, provide pt/family education and to maximize pt's level of independence in the home and community environment.     Goals as follows:  Short term goals:  6 weeks or 10 visits   1.  Pt will demonstrate increased lumbar ROM by at least 3 degrees from the initial ROM value with improvements noted in functional ROM and ability to perform ADLs  2.  Pt will  demonstrate increased maximum isometric torque value by 5% when compared to the initial value resulting in improved ability to perform bending, lifting, and carrying activities safely, confidently.  3.  Patient report a reduction in worst pain score by 1-2 points for improved tolerance during work and recreational activities  4.  Pt able to perform HEP correctly with minimal cueing or supervision for therapist     Long term goals: 13 weeks or 20 visits   1. Pt will demonstrate increased lumbar ROM by at least 10 degrees from initial ROM value, resulting in improved ability to perform functional fwd bending while standing and sitting.   2. Pt will demonstrate increased maximum isometric torque value by 10% when compared to the initial value resulting in improved ability to perform bending, lifting, and carrying activities safely, confidently.  3. Pt to demonstrate ability to independently control and reduce their pain through posture positioning and mechanical movements throughout a typical day.  4.  Patient will demonstrate improved overall function per FOTO Survey to CK = at least 40% but < 60% impaired, limited or restricted score or less.    Plan     Continue with established Plan of Care towards established PT goals.     Certification Period:6/11/2018 to 9/11/2018    Therapist: Arianna Chase, PT  07/16/2018

## 2018-07-23 ENCOUNTER — CLINICAL SUPPORT (OUTPATIENT)
Dept: REHABILITATION | Facility: HOSPITAL | Age: 53
End: 2018-07-23
Payer: MEDICARE

## 2018-07-23 DIAGNOSIS — M54.50 CHRONIC BILATERAL LOW BACK PAIN WITHOUT SCIATICA: ICD-10-CM

## 2018-07-23 DIAGNOSIS — G89.29 CHRONIC BILATERAL LOW BACK PAIN WITHOUT SCIATICA: ICD-10-CM

## 2018-07-23 DIAGNOSIS — M53.86 DECREASED RANGE OF MOTION OF LUMBAR SPINE: ICD-10-CM

## 2018-07-23 DIAGNOSIS — M62.81 WEAKNESS OF TRUNK MUSCULATURE: ICD-10-CM

## 2018-07-23 PROCEDURE — 97140 MANUAL THERAPY 1/> REGIONS: CPT | Mod: PN

## 2018-07-23 PROCEDURE — 97110 THERAPEUTIC EXERCISES: CPT | Mod: PN

## 2018-07-23 NOTE — PROGRESS NOTES
[facility]      Name: Daniel Chapa  Clinic Number: [id number]  Diagnosis:   Encounter Diagnoses   Name Primary?    Chronic bilateral low back pain without sciatica     Weakness of trunk musculature     Decreased range of motion of lumbar spine      Physician: Ramos Lisa FNP  Precautions: Standard   Visit #: 7  12  COBURN: 2018  PTA Visit #: 1    Time In: 09:00  Time Out: 10:20  Total Treatment Time: 70 mins (1:1 with PT for 40 mins)    Pain Pattern: 1 PEN  Date POC Signed: Fax to KEYUR Juarez    Subjective     Will reports Mid and LBP this morning.  He reports that his symptoms increase with functional activities.  He reports compliance with his HEP.  Cupping and taping help his symptoms. His pain worsens when he takes the tape off.  Ice packs are used for relief of symptoms.    Patient reports their pain to be 5 out of 10 on a 0-10 scale with 0 being no pain and 10 being the worst pain imaginable.    Pain Location: lower back    Objective           Baseline Isometric Testing on Med X equipment: Testing administered by PT  Date of testin2018   ROM 12-24 deg   Max Peak Torque 152   Min Peak Torque 112   Flex/Ext Ratio 0.1   % below normative data -40%   Counter weight 646   femur 5   Seat pad 0        CMS Impairment/Limitation/Restriction for FOTO Lumbar Spine Survey  Status Limitation G-Code CMS Severity Modifier  Intake  60% Current Status CL - At least 60 percent but less than 80 percent  Goal CK      Treatment      Will received individual therapeutic exercises to develop strength, endurance, ROM, flexibility, posture and core stabilization for 60 minutes including:    Seated trunk flexion with swissball 5:10 sec hold (all 3 planes)  SKTC: 3 x 30''   LTR: 10x    [facility] 2018   Visit Number 7   VAS Pain Rating 5   Treadmill Time (in min.) 10   Speed 1.5   Incline -   Extension in Standing -   Flexion in Lying -   Flexion in Sitting -    Manual Therapy 10   Lumbar Extension Seat Pad -   Femur Restraint -   Top Dead Center -   Counterweight -   Lumbar Flexion -   Lumbar Extension -   Lumbar Peak Torque -   Min Torque -   Percent From Norm -   Lumbar Weight 45   Repetitions 20   Rating of Perceived Exertion 6   Ice - Z Lie (in min.) 10         Peripheral muscle strengthening which included 1 set of 15-20 repetitions at a slow, controlled 7 second per rep pace focused on strengthening supporting musculature for improved body mechanics and functional mobility. Pt and therapist focused on proper form during treatment to ensure optimal strengthening of each targeted muscle group. Machines were utilized including torso rotation, leg extension, leg curl, chest press, upright row, tricep extension, bicep curl, leg press, and hip abduction.      Will received the following manual therapy techniques:   10 min x Vacuum/cupping STM with manual therapy techniques was performed to Bilateral lumbar paraspinals to decrease muscle tightness, increase circulation and promote healing process. The pt's skin was monitored for redness adjusting pressure as needed. The pt was instructed in possible side effects of bruising and/or soreness.   Kinesio tape applied to B lumbar extensors with biofreeze spray. Patient received education regarding appropriate care and removal of Kinesiotape. Patient instructed in proper removal techniques if skin irritation occurs.         Written Home Exercises Provided:   Double knees to chest  Lower trunk rotation  Seated trunk flexion  Single knee to chest   ext in standing  Pt demo good understanding of the education provided. Will demonstrated good return demonstration of activities.     Education provided re:   Will verbalized good understanding of education provided.   No spiritual or educational barriers to learning provided    Lumbar roll use compliance: better with lumbar roll but not using, encouraged      Assessment     Patient  tolerated treatment session well today with increased repetitions on Med X.  Peripheral strengthening exercises were added as well.  He has good tolerance to taping and cupping.  Patient was educated on importance of posture, HEP, and use of cold packs for pain relief.    This is a 53 y.o. male referred to outpatient physical therapy and presents with a medical diagnosis of Chronic lower back pain without sciatica  and demonstrates limitations as described in the problem list. Pt prognosis is Good minus.Pt will continue to benefit from skilled outpatient physical therapy to address the deficits listed in the problem list, provide pt/family education and to maximize pt's level of independence in the home and community environment.     Goals as follows:  Short term goals:  6 weeks or 10 visits   1.  Pt will demonstrate increased lumbar ROM by at least 3 degrees from the initial ROM value with improvements noted in functional ROM and ability to perform ADLs  2.  Pt will demonstrate increased maximum isometric torque value by 5% when compared to the initial value resulting in improved ability to perform bending, lifting, and carrying activities safely, confidently.  3.  Patient report a reduction in worst pain score by 1-2 points for improved tolerance during work and recreational activities  4.  Pt able to perform HEP correctly with minimal cueing or supervision for therapist     Long term goals: 13 weeks or 20 visits   1. Pt will demonstrate increased lumbar ROM by at least 10 degrees from initial ROM value, resulting in improved ability to perform functional fwd bending while standing and sitting.   2. Pt will demonstrate increased maximum isometric torque value by 10% when compared to the initial value resulting in improved ability to perform bending, lifting, and carrying activities safely, confidently.  3. Pt to demonstrate ability to independently control and reduce their pain through posture positioning and  mechanical movements throughout a typical day.  4.  Patient will demonstrate improved overall function per FOTO Survey to CK = at least 40% but < 60% impaired, limited or restricted score or less.    Plan     Continue with established Plan of Care towards established PT goals.     Certification Period:6/11/2018 to 9/11/2018    Therapist: Sherif Rosario, PT  07/23/2018

## 2018-08-01 ENCOUNTER — CLINICAL SUPPORT (OUTPATIENT)
Dept: REHABILITATION | Facility: HOSPITAL | Age: 53
End: 2018-08-01
Payer: MEDICARE

## 2018-08-01 DIAGNOSIS — M54.50 CHRONIC BILATERAL LOW BACK PAIN WITHOUT SCIATICA: ICD-10-CM

## 2018-08-01 DIAGNOSIS — M62.81 WEAKNESS OF TRUNK MUSCULATURE: ICD-10-CM

## 2018-08-01 DIAGNOSIS — G89.29 CHRONIC BILATERAL LOW BACK PAIN WITHOUT SCIATICA: ICD-10-CM

## 2018-08-01 DIAGNOSIS — M53.86 DECREASED RANGE OF MOTION OF LUMBAR SPINE: ICD-10-CM

## 2018-08-01 PROCEDURE — 97110 THERAPEUTIC EXERCISES: CPT | Mod: PN

## 2018-08-01 NOTE — PROGRESS NOTES
[facility]      Name: Daniel Chapa  Clinic Number: [id number]  Diagnosis:   Encounter Diagnoses   Name Primary?    Chronic bilateral low back pain without sciatica     Weakness of trunk musculature     Decreased range of motion of lumbar spine      Physician: Ramos Lisa FNP  Precautions: Standard   Visit #:   COBURN: 2018  PTA Visit #: 1    Time In: 0900  Time Out: 1000  Total Treatment Time: 50 mins (1:1 with PTA for 30 mins)    Pain Pattern: 1 PEN  Date POC Signed: Fax to KEYUR Juarez    Subjective     Will reports that he continues to have the same pain in his low back, Right worse than Left. Patient notes that he feels like he is getting better since beginning PT noting a 30% improvement. Patient states that the tape makes him feel better, but when he takes it off his pain is worse.    Patient reports their pain to be 5 out of 10 on a 0-10 scale with 0 being no pain and 10 being the worst pain imaginable.    Pain Location: lower back    Objective     Baseline Isometric Testing on Med X equipment: Testing administered by PT  Date of testin2018   ROM 12-24 deg   Max Peak Torque 152   Min Peak Torque 112   Flex/Ext Ratio 0.1   % below normative data -40%   Counter weight 646   femur 5   Seat pad 0      CMS Impairment/Limitation/Restriction for FOTO Lumbar Spine Survey  Status Limitation G-Code CMS Severity Modifier  Intake 40% 60%  Predicted 52% 48% Goal Status+ CK - At least 40 percent but less than 60 percent  2018 48% 52% Current Status CK - At least 40 percent but less than 60 percent  D/C Status CK **only report if this is discharge survey    Treatment      Will received individual therapeutic exercises to develop strength, endurance, ROM, flexibility, posture and core stabilization for 60 minutes including:    Seated trunk flexion with swissball 5:10 sec hold (all 3 planes)  SKTC: 3 x 30''   LTR: 10x  Right QL stretch in Left SL x 2  minutes     [facility] 8/1/2018   Visit Number 8   VAS Pain Rating 5   Treadmill Time (in min.) 10   Speed 1.5   Incline 0   Flexion in Lying 10   Manual Therapy 10   Lumbar Weight 45   Repetitions 20   Rating of Perceived Exertion 5   Ice - Z Lie (in min.) 10     Peripheral muscle strengthening which included 1 set of 15-20 repetitions at a slow, controlled 7 second per rep pace focused on strengthening supporting musculature for improved body mechanics and functional mobility. Pt and therapist focused on proper form during treatment to ensure optimal strengthening of each targeted muscle group. Machines were utilized including torso rotation, leg extension, leg curl, chest press, upright row, tricep extension, bicep curl, leg press, and hip abduction.    Will received the following manual therapy techniques:   10 min x IASTM and STM to Right lumbar paraspinals and QL    Written Home Exercises Provided:   Double knees to chest  Lower trunk rotation  Seated trunk flexion  Single knee to chest  Ext in standing  Pt demo good understanding of the education provided. Will demonstrated good return demonstration of activities.     Education provided re:   Will verbalized good understanding of education provided.   No spiritual or educational barriers to learning provided    Lumbar roll use compliance: better with lumbar roll but not using, encouraged    Assessment     Patient tolerated treatment session well today. Increased soft tissue restrictions present in Right lumbar paraspinals and QL with good response to manual care. Patient reported a decrease in symptoms, but a continued pain level of 5 post treatment session. Weight was not increased on MedX resisted lumbar extension machine this visit, however patient was able to complete 20 repetitions with an RPE of 5 reporting difficulty with exercise. Patient with improvement in FOTO Limitation Score demonstrating some improvement in overall function.     This is  a 53 y.o. male referred to outpatient physical therapy and presents with a medical diagnosis of Chronic lower back pain without sciatica  and demonstrates limitations as described in the problem list. Pt prognosis is Good minus.Pt will continue to benefit from skilled outpatient physical therapy to address the deficits listed in the problem list, provide pt/family education and to maximize pt's level of independence in the home and community environment.     Goals as follows:  Short term goals:  6 weeks or 10 visits   1.  Pt will demonstrate increased lumbar ROM by at least 3 degrees from the initial ROM value with improvements noted in functional ROM and ability to perform ADLs  2.  Pt will demonstrate increased maximum isometric torque value by 5% when compared to the initial value resulting in improved ability to perform bending, lifting, and carrying activities safely, confidently.  3.  Patient report a reduction in worst pain score by 1-2 points for improved tolerance during work and recreational activities  4.  Pt able to perform HEP correctly with minimal cueing or supervision for therapist     Long term goals: 13 weeks or 20 visits   1. Pt will demonstrate increased lumbar ROM by at least 10 degrees from initial ROM value, resulting in improved ability to perform functional fwd bending while standing and sitting.   2. Pt will demonstrate increased maximum isometric torque value by 10% when compared to the initial value resulting in improved ability to perform bending, lifting, and carrying activities safely, confidently.  3. Pt to demonstrate ability to independently control and reduce their pain through posture positioning and mechanical movements throughout a typical day.  4.  Patient will demonstrate improved overall function per FOTO Survey to CK = at least 40% but < 60% impaired, limited or restricted score or less.    Plan     Continue with established Plan of Care towards established PT goals.      Certification Period:6/11/2018 to 9/11/2018    Therapist: Joy Chapin, THEO  08/01/2018

## 2018-08-08 ENCOUNTER — DOCUMENTATION ONLY (OUTPATIENT)
Dept: REHABILITATION | Facility: HOSPITAL | Age: 53
End: 2018-08-08

## 2018-08-08 NOTE — PROGRESS NOTES
PT called pt to verify why he cancelled all his future appts. Pt states his lumbar ROM and UE and LE got better but his lower back pain has increased since initial eval. Pt states he wants to be d/c from the healthy [facility] program. PT recommended PT to return to his M.D for follow up. PT will be disachrged to  [facility] program today.

## 2024-05-04 ENCOUNTER — HOSPITAL ENCOUNTER (OUTPATIENT)
Facility: HOSPITAL | Age: 59
Discharge: HOME | End: 2024-05-04
Attending: STUDENT IN AN ORGANIZED HEALTH CARE EDUCATION/TRAINING PROGRAM | Admitting: HOSPITALIST
Payer: MEDICARE

## 2024-05-04 VITALS
DIASTOLIC BLOOD PRESSURE: 74 MMHG | SYSTOLIC BLOOD PRESSURE: 116 MMHG | HEART RATE: 75 BPM | HEIGHT: 69 IN | RESPIRATION RATE: 19 BRPM | BODY MASS INDEX: 44.43 KG/M2 | TEMPERATURE: 98 F | WEIGHT: 300 LBS | OXYGEN SATURATION: 99 %

## 2024-05-04 DIAGNOSIS — R07.9 CHEST PAIN AT REST: ICD-10-CM

## 2024-05-04 DIAGNOSIS — R07.9 CHEST PAIN: ICD-10-CM

## 2024-05-04 PROBLEM — I25.110 CORONARY ARTERY DISEASE INVOLVING NATIVE CORONARY ARTERY OF NATIVE HEART WITH UNSTABLE ANGINA PECTORIS: Status: ACTIVE | Noted: 2024-05-04

## 2024-05-04 PROBLEM — E11.9 DM (DIABETES MELLITUS): Status: ACTIVE | Noted: 2024-05-04

## 2024-05-04 PROBLEM — E66.01 CLASS 3 SEVERE OBESITY WITH BODY MASS INDEX (BMI) OF 40.0 TO 44.9 IN ADULT: Status: ACTIVE | Noted: 2024-05-04

## 2024-05-04 PROBLEM — R94.31 ABNORMAL EKG: Status: ACTIVE | Noted: 2024-05-04

## 2024-05-04 PROBLEM — I77.810 AORTIC ROOT DILATATION: Status: ACTIVE | Noted: 2024-05-04

## 2024-05-04 PROBLEM — E78.5 HYPERLIPEMIA: Status: ACTIVE | Noted: 2024-05-04

## 2024-05-04 LAB
ALBUMIN SERPL BCP-MCNC: 4.2 G/DL (ref 3.5–5.2)
ALP SERPL-CCNC: 89 U/L (ref 55–135)
ALT SERPL W/O P-5'-P-CCNC: 18 U/L (ref 10–44)
ANION GAP SERPL CALC-SCNC: 9 MMOL/L (ref 8–16)
ASCENDING AORTA: 3.37 CM
AST SERPL-CCNC: 18 U/L (ref 10–40)
AV INDEX (PROSTH): 0.94
AV MEAN GRADIENT: 2 MMHG
AV PEAK GRADIENT: 4 MMHG
AV VALVE AREA BY VELOCITY RATIO: 3.8 CM²
AV VALVE AREA: 5.03 CM²
AV VELOCITY RATIO: 0.71
BASOPHILS # BLD AUTO: 0.02 K/UL (ref 0–0.2)
BASOPHILS NFR BLD: 0.4 % (ref 0–1.9)
BILIRUB SERPL-MCNC: 0.7 MG/DL (ref 0.1–1)
BNP SERPL-MCNC: <10 PG/ML (ref 0–99)
BSA FOR ECHO PROCEDURE: 2.57 M2
BUN SERPL-MCNC: 12 MG/DL (ref 6–20)
CALCIUM SERPL-MCNC: 9.9 MG/DL (ref 8.7–10.5)
CHLORIDE SERPL-SCNC: 104 MMOL/L (ref 95–110)
CHOLEST SERPL-MCNC: 156 MG/DL (ref 120–199)
CHOLEST/HDLC SERPL: 3.7 {RATIO} (ref 2–5)
CK SERPL-CCNC: 209 U/L (ref 20–200)
CO2 SERPL-SCNC: 27 MMOL/L (ref 23–29)
CREAT SERPL-MCNC: 1.1 MG/DL (ref 0.5–1.4)
CV ECHO LV RWT: 0.52 CM
D DIMER PPP IA.FEU-MCNC: <0.19 MG/L FEU
DIFFERENTIAL METHOD BLD: NORMAL
DOP CALC AO PEAK VEL: 0.97 M/S
DOP CALC AO VTI: 13.5 CM
DOP CALC LVOT AREA: 5.3 CM2
DOP CALC LVOT DIAMETER: 2.61 CM
DOP CALC LVOT PEAK VEL: 0.69 M/S
DOP CALC LVOT STROKE VOLUME: 67.91 CM3
DOP CALC MV VTI: 21.4 CM
DOP CALCLVOT PEAK VEL VTI: 12.7 CM
E WAVE DECELERATION TIME: 235.12 MSEC
E/A RATIO: 1.07
E/E' RATIO: 5.9 M/S
ECHO LV POSTERIOR WALL: 1.14 CM (ref 0.6–1.1)
EOSINOPHIL # BLD AUTO: 0 K/UL (ref 0–0.5)
EOSINOPHIL NFR BLD: 0.8 % (ref 0–8)
ERYTHROCYTE [DISTWIDTH] IN BLOOD BY AUTOMATED COUNT: 12.3 % (ref 11.5–14.5)
EST. GFR  (NO RACE VARIABLE): >60 ML/MIN/1.73 M^2
FRACTIONAL SHORTENING: 35 % (ref 28–44)
GLUCOSE SERPL-MCNC: 103 MG/DL (ref 70–110)
HCT VFR BLD AUTO: 47.6 % (ref 40–54)
HDLC SERPL-MCNC: 42 MG/DL (ref 40–75)
HDLC SERPL: 26.9 % (ref 20–50)
HGB BLD-MCNC: 16 G/DL (ref 14–18)
IMM GRANULOCYTES # BLD AUTO: 0.01 K/UL (ref 0–0.04)
IMM GRANULOCYTES NFR BLD AUTO: 0.2 % (ref 0–0.5)
INTERVENTRICULAR SEPTUM: 0.83 CM (ref 0.6–1.1)
LA MINOR: 4.19 CM
LA WIDTH: 2.1 CM
LDLC SERPL CALC-MCNC: 98.6 MG/DL (ref 63–159)
LEFT ATRIUM SIZE: 4.23 CM
LEFT INTERNAL DIMENSION IN SYSTOLE: 2.89 CM (ref 2.1–4)
LEFT VENTRICLE DIASTOLIC VOLUME INDEX: 36.18 ML/M2
LEFT VENTRICLE DIASTOLIC VOLUME: 88.64 ML
LEFT VENTRICLE MASS INDEX: 60 G/M2
LEFT VENTRICLE SYSTOLIC VOLUME INDEX: 13.1 ML/M2
LEFT VENTRICLE SYSTOLIC VOLUME: 32.01 ML
LEFT VENTRICULAR INTERNAL DIMENSION IN DIASTOLE: 4.42 CM (ref 3.5–6)
LEFT VENTRICULAR MASS: 145.84 G
LV LATERAL E/E' RATIO: 7.75 M/S
LV SEPTAL E/E' RATIO: 4.77 M/S
LVOT MG: 0.94 MMHG
LVOT MV: 0.46 CM/S
LYMPHOCYTES # BLD AUTO: 2.1 K/UL (ref 1–4.8)
LYMPHOCYTES NFR BLD: 39.6 % (ref 18–48)
MAGNESIUM SERPL-MCNC: 1.9 MG/DL (ref 1.6–2.6)
MCH RBC QN AUTO: 29.5 PG (ref 27–31)
MCHC RBC AUTO-ENTMCNC: 33.6 G/DL (ref 32–36)
MCV RBC AUTO: 88 FL (ref 82–98)
MONOCYTES # BLD AUTO: 0.4 K/UL (ref 0.3–1)
MONOCYTES NFR BLD: 7.9 % (ref 4–15)
MV MEAN GRADIENT: 1 MMHG
MV PEAK A VEL: 0.58 M/S
MV PEAK E VEL: 0.62 M/S
MV PEAK GRADIENT: 3 MMHG
MV STENOSIS PRESSURE HALF TIME: 68.19 MS
MV VALVE AREA BY CONTINUITY EQUATION: 3.17 CM2
MV VALVE AREA P 1/2 METHOD: 3.23 CM2
NEUTROPHILS # BLD AUTO: 2.7 K/UL (ref 1.8–7.7)
NEUTROPHILS NFR BLD: 51.1 % (ref 38–73)
NONHDLC SERPL-MCNC: 114 MG/DL
NRBC BLD-RTO: 0 /100 WBC
OHS LV EJECTION FRACTION SIMPSONS BIPLANE MOD: 54 %
OHS QRS DURATION: 90 MS
OHS QTC CALCULATION: 425 MS
PLATELET # BLD AUTO: 232 K/UL (ref 150–450)
PMV BLD AUTO: 9.5 FL (ref 9.2–12.9)
POCT GLUCOSE: 80 MG/DL (ref 70–110)
POTASSIUM SERPL-SCNC: 3.9 MMOL/L (ref 3.5–5.1)
PROT SERPL-MCNC: 7.4 G/DL (ref 6–8.4)
RA PRESSURE ESTIMATED: 0 MMHG
RBC # BLD AUTO: 5.43 M/UL (ref 4.6–6.2)
SINUS: 4.22 CM
SODIUM SERPL-SCNC: 140 MMOL/L (ref 136–145)
STJ: 3.78 CM
TDI LATERAL: 0.08 M/S
TDI SEPTAL: 0.13 M/S
TDI: 0.11 M/S
TRIGL SERPL-MCNC: 77 MG/DL (ref 30–150)
TROPONIN I SERPL DL<=0.01 NG/ML-MCNC: 0.01 NG/ML (ref 0–0.03)
TROPONIN I SERPL DL<=0.01 NG/ML-MCNC: <0.006 NG/ML (ref 0–0.03)
TSH SERPL DL<=0.005 MIU/L-ACNC: 2.31 UIU/ML (ref 0.4–4)
WBC # BLD AUTO: 5.3 K/UL (ref 3.9–12.7)
Z-SCORE OF LEFT VENTRICULAR DIMENSION IN END DIASTOLE: -10.22
Z-SCORE OF LEFT VENTRICULAR DIMENSION IN END SYSTOLE: -7.35

## 2024-05-04 PROCEDURE — G0378 HOSPITAL OBSERVATION PER HR: HCPCS

## 2024-05-04 PROCEDURE — 84484 ASSAY OF TROPONIN QUANT: CPT | Mod: 91 | Performed by: STUDENT IN AN ORGANIZED HEALTH CARE EDUCATION/TRAINING PROGRAM

## 2024-05-04 PROCEDURE — 25000003 PHARM REV CODE 250: Performed by: NURSE PRACTITIONER

## 2024-05-04 PROCEDURE — 99204 OFFICE O/P NEW MOD 45 MIN: CPT | Mod: 25,,, | Performed by: INTERNAL MEDICINE

## 2024-05-04 PROCEDURE — 80053 COMPREHEN METABOLIC PANEL: CPT | Performed by: STUDENT IN AN ORGANIZED HEALTH CARE EDUCATION/TRAINING PROGRAM

## 2024-05-04 PROCEDURE — 93010 ELECTROCARDIOGRAM REPORT: CPT | Mod: ,,, | Performed by: INTERNAL MEDICINE

## 2024-05-04 PROCEDURE — 25000242 PHARM REV CODE 250 ALT 637 W/ HCPCS: Performed by: STUDENT IN AN ORGANIZED HEALTH CARE EDUCATION/TRAINING PROGRAM

## 2024-05-04 PROCEDURE — 99285 EMERGENCY DEPT VISIT HI MDM: CPT | Mod: 25

## 2024-05-04 PROCEDURE — 80061 LIPID PANEL: CPT | Performed by: STUDENT IN AN ORGANIZED HEALTH CARE EDUCATION/TRAINING PROGRAM

## 2024-05-04 PROCEDURE — 82962 GLUCOSE BLOOD TEST: CPT

## 2024-05-04 PROCEDURE — 25500020 PHARM REV CODE 255: Performed by: INTERNAL MEDICINE

## 2024-05-04 PROCEDURE — 84443 ASSAY THYROID STIM HORMONE: CPT | Performed by: STUDENT IN AN ORGANIZED HEALTH CARE EDUCATION/TRAINING PROGRAM

## 2024-05-04 PROCEDURE — 25000003 PHARM REV CODE 250: Performed by: STUDENT IN AN ORGANIZED HEALTH CARE EDUCATION/TRAINING PROGRAM

## 2024-05-04 PROCEDURE — 85025 COMPLETE CBC W/AUTO DIFF WBC: CPT | Performed by: STUDENT IN AN ORGANIZED HEALTH CARE EDUCATION/TRAINING PROGRAM

## 2024-05-04 PROCEDURE — 85379 FIBRIN DEGRADATION QUANT: CPT | Performed by: STUDENT IN AN ORGANIZED HEALTH CARE EDUCATION/TRAINING PROGRAM

## 2024-05-04 PROCEDURE — 82550 ASSAY OF CK (CPK): CPT | Performed by: STUDENT IN AN ORGANIZED HEALTH CARE EDUCATION/TRAINING PROGRAM

## 2024-05-04 PROCEDURE — 93005 ELECTROCARDIOGRAM TRACING: CPT

## 2024-05-04 PROCEDURE — 83735 ASSAY OF MAGNESIUM: CPT | Performed by: STUDENT IN AN ORGANIZED HEALTH CARE EDUCATION/TRAINING PROGRAM

## 2024-05-04 PROCEDURE — 83880 ASSAY OF NATRIURETIC PEPTIDE: CPT | Performed by: STUDENT IN AN ORGANIZED HEALTH CARE EDUCATION/TRAINING PROGRAM

## 2024-05-04 RX ORDER — OXYCODONE AND ACETAMINOPHEN 5; 325 MG/1; MG/1
1 TABLET ORAL EVERY 4 HOURS PRN
Status: DISCONTINUED | OUTPATIENT
Start: 2024-05-04 | End: 2024-05-04 | Stop reason: HOSPADM

## 2024-05-04 RX ORDER — GLUCAGON 1 MG
1 KIT INJECTION
Status: DISCONTINUED | OUTPATIENT
Start: 2024-05-04 | End: 2024-05-04 | Stop reason: HOSPADM

## 2024-05-04 RX ORDER — VALSARTAN 80 MG/1
80 TABLET ORAL DAILY
Status: DISCONTINUED | OUTPATIENT
Start: 2024-05-05 | End: 2024-05-04 | Stop reason: HOSPADM

## 2024-05-04 RX ORDER — ASPIRIN 325 MG
325 TABLET ORAL
Status: DISCONTINUED | OUTPATIENT
Start: 2024-05-04 | End: 2024-05-04

## 2024-05-04 RX ORDER — SODIUM CHLORIDE 0.9 % (FLUSH) 0.9 %
10 SYRINGE (ML) INJECTION
Status: DISCONTINUED | OUTPATIENT
Start: 2024-05-04 | End: 2024-05-04 | Stop reason: HOSPADM

## 2024-05-04 RX ORDER — INSULIN ASPART 100 [IU]/ML
0-10 INJECTION, SOLUTION INTRAVENOUS; SUBCUTANEOUS EVERY 6 HOURS PRN
Status: DISCONTINUED | OUTPATIENT
Start: 2024-05-04 | End: 2024-05-04 | Stop reason: HOSPADM

## 2024-05-04 RX ORDER — NITROGLYCERIN 0.4 MG/1
0.4 TABLET SUBLINGUAL EVERY 5 MIN PRN
Status: DISCONTINUED | OUTPATIENT
Start: 2024-05-04 | End: 2024-05-04 | Stop reason: HOSPADM

## 2024-05-04 RX ORDER — ATORVASTATIN CALCIUM 40 MG/1
40 TABLET, FILM COATED ORAL DAILY
Status: DISCONTINUED | OUTPATIENT
Start: 2024-05-04 | End: 2024-05-04 | Stop reason: HOSPADM

## 2024-05-04 RX ORDER — ASPIRIN 325 MG
325 TABLET ORAL
Status: COMPLETED | OUTPATIENT
Start: 2024-05-04 | End: 2024-05-04

## 2024-05-04 RX ADMIN — HUMAN ALBUMIN MICROSPHERES AND PERFLUTREN 0.11 MG: 10; .22 INJECTION, SOLUTION INTRAVENOUS at 01:05

## 2024-05-04 RX ADMIN — ATORVASTATIN CALCIUM 40 MG: 40 TABLET, FILM COATED ORAL at 12:05

## 2024-05-04 RX ADMIN — NITROGLYCERIN 0.4 MG: 0.4 TABLET, ORALLY DISINTEGRATING SUBLINGUAL at 10:05

## 2024-05-04 RX ADMIN — ASPIRIN 325 MG ORAL TABLET 325 MG: 325 PILL ORAL at 11:05

## 2024-05-04 NOTE — ED NOTES
Pt updated on plan of care. Pt verbalized understanding. Pt instructed to use call light if needed.

## 2024-05-04 NOTE — HOSPITAL COURSE
Admission to the hospital for chest pain.  D-dimer negative.  Patient was chest pain-free with my evaluation.  EKG no evidence of ischemia and troponin trend negative.  Patient remained chest pain-free in the ED. continue aspirin statin.  2D echo showed normal EF 55-60%, diastolic dysfunction can not be determined and normal wall motion.  Patient has follow up with Dr. Mcdonald in 4 days 05/08/2024.  Patient stable for discharge home with close follow up with cardiologist.

## 2024-05-04 NOTE — SUBJECTIVE & OBJECTIVE
Past Medical History:   Diagnosis Date    Back pain     Diabetes mellitus     Hyperlipemia     Hyperlipidemia     Hypertension        No past surgical history on file.    Review of patient's allergies indicates:  No Known Allergies    Current Facility-Administered Medications   Medication Dose Route Frequency Provider Last Rate Last Admin    atorvastatin tablet 40 mg  40 mg Oral Daily Carin Cunninghamh, NP   40 mg at 05/04/24 1232    dextrose 10% bolus 125 mL 125 mL  12.5 g Intravenous PRN Adelaida Castro MD        glucagon (human recombinant) injection 1 mg  1 mg Intramuscular PRN Carin Cunningham NP        insulin aspart U-100 pen 0-10 Units  0-10 Units Subcutaneous Q6H PRN Carin Cunningham NP        nitroGLYCERIN SL tablet 0.4 mg  0.4 mg Sublingual Q5 Min PRN Power Ferrari MD   0.4 mg at 05/04/24 1018    sodium chloride 0.9% flush 10 mL  10 mL Intravenous PRN Carin Cunningham NP         Current Outpatient Medications   Medication Sig Dispense Refill    acyclovir 5% (ZOVIRAX) 5 % ointment Apply topically every 3 (three) hours.      aspirin 81 MG Chew Take 81 mg by mouth once daily.        hydrocodone-acetaminophen (LORTAB)  mg per tablet Take 1 tablet by mouth every 6 (six) hours as needed.        lactulose (CHRONULAC) 10 gram/15 mL solution Take by mouth 3 (three) times daily.      metformin (GLUCOPHAGE) 1000 MG tablet Take 1,000 mg by mouth 2 (two) times daily with meals.        oxycodone-acetaminophen (PERCOCET) 5-325 mg per tablet Take 1 tablet by mouth every 4 (four) hours as needed for Pain. 20 tablet 0    simvastatin (ZOCOR) 20 MG tablet Take 20 mg by mouth every evening.        valsartan (DIOVAN) 80 MG tablet Take 80 mg by mouth once daily.         Facility-Administered Medications Ordered in Other Encounters   Medication Dose Route Frequency Provider Last Rate Last Admin    [COMPLETED] perflutren protein-A microsphr 0.22 mg/mL IV susp  0.5 mL Intravenous Once Celso Nieto MD    0.11 mg at 05/04/24 1315     Family History       Problem Relation (Age of Onset)    Heart disease Father          Tobacco Use    Smoking status: Never    Smokeless tobacco: Not on file   Substance and Sexual Activity    Alcohol use: Yes     Comment: social    Drug use: No    Sexual activity: Yes     Partners: Female     Review of Systems   Constitutional:  Positive for activity change and diaphoresis. Negative for fatigue and fever.   HENT: Negative.     Respiratory:  Positive for chest tightness and shortness of breath.    Gastrointestinal: Negative.    Genitourinary: Negative.    Musculoskeletal:  Positive for back pain.   Skin: Negative.    Neurological: Negative.    Hematological: Negative.    Psychiatric/Behavioral: Negative.       Objective:     Vital Signs (Most Recent):  Temp: 97.9 °F (36.6 °C) (05/04/24 0942)  Pulse: 75 (05/04/24 1133)  Resp: (!) 21 (05/04/24 1133)  BP: 103/70 (05/04/24 1133)  SpO2: 99 % (05/04/24 1133) Vital Signs (24h Range):  Temp:  [97.9 °F (36.6 °C)] 97.9 °F (36.6 °C)  Pulse:  [75-92] 75  Resp:  [18-22] 21  SpO2:  [95 %-99 %] 99 %  BP: (100-128)/(62-80) 103/70     Weight: 136.1 kg (300 lb)  Body mass index is 44.3 kg/m².     Physical Exam  Constitutional:       Appearance: Normal appearance. He is obese. He is not ill-appearing.   HENT:      Head: Atraumatic.      Mouth/Throat:      Mouth: Mucous membranes are dry.   Cardiovascular:      Rate and Rhythm: Normal rate and regular rhythm.      Pulses: Normal pulses.      Heart sounds: Normal heart sounds.   Pulmonary:      Breath sounds: Normal breath sounds.   Musculoskeletal:      Right lower leg: No edema.      Left lower leg: No edema.   Neurological:      General: No focal deficit present.      Mental Status: He is alert. Mental status is at baseline.                Significant Labs: All pertinent labs within the past 24 hours have been reviewed.    Significant Imaging: I have reviewed all pertinent imaging results/findings within  the past 24 hours.

## 2024-05-04 NOTE — ASSESSMENT & PLAN NOTE
History of CAD status post MI 10 years ago?? who presents to ED for intermittent left chest wall pain described as sharp heavy for the last 2 weeks.  Symptoms sometimes occur with shortness a breath and left arm pain.  This a.m. patient woke up at 8:30 a.m. with left chest wall pain associated with shortness a breath and diaphoretic  Patient is poor historian  EKG no evidence of ischemia and 1st set of troponin negative  D-dimer negative, TSH okay LDL 98.6   Patient received nitro with improvement in symptoms.  Currently patient is chest pain-free  2D echo showed EF 55-60%, diastolic dysfunction can not be determined, normal wall motion  Aspirin statin  Trend troponin  Cardiology consult

## 2024-05-04 NOTE — HPI
Will Bias is a 58 yo male with significant history for hypertension, hyperlipidemia, diabetes type 2, CAD status post MI 10 years ago (at [facility]?) and chronic lower back pain on home Percocet who presents to the hospital for intermittent left chest wall pain described as sharp heaviness radiating down left arm associated with shortness of breath x2 weeks who presents to the ED for left-sided chest pain with shortness of breath and diaphoresis at 8:30 a.m. today.  No arm radiation today.  Chest pain improves with rest. Chest pain occurs with rest and activity. Denies fever chills cough. Family history of brother MI?  Father heart disease?.  He never smoked.  Denies drinking alcohol.  Denies illicit drugs.  Patient can not remember if he had angiogram or not? States chest pain similar to his pain when he had MI 10 yrs ago. States take ASA 81 mg daily. Patient was seen by primary 4/29 and instructed to come to the hospital if persistent chest pain. Patient have appointment with Cardiology Dr. Miranda at [facility] on 5/8.        EKG normal sinus rhythm no evidence of ischemia.  First set troponin negative.  D-dimer negative.  Chest x-ray clear.

## 2024-05-04 NOTE — ASSESSMENT & PLAN NOTE
Patient's FSGs are controlled on current medication regimen.  Last A1c reviewed-   Lab Results   Component Value Date    HGBA1C 5.9 (H) 04/29/2024     Most recent fingerstick glucose reviewed-   Recent Labs   Lab 05/04/24  1232   POCTGLUCOSE 80     Current correctional scale  Low  Maintain anti-hyperglycemic dose as follows-   Antihyperglycemics (From admission, onward)      Start     Stop Route Frequency Ordered    05/04/24 1254  insulin aspart U-100 pen 0-10 Units         -- SubQ Every 6 hours PRN 05/04/24 1154          Hold Oral hypoglycemics while patient is in the hospital.

## 2024-05-04 NOTE — ADMISSIONCARE
AdmissionCare    Guideline: Chest Pain - OBS, Observation    Based on the indications selected for the patient, the bed status of Observation was determined to be MET    The following indications were selected as present at the time of evaluation of the patient:      - Chest pain (or other anginal equivalent) not classified as low risk for acute coronary syndrome, as indicated by 1 or more of the following:    - Patient classified as intermediate risk or high risk for acute coronary syndrome (eg, via use of a clinical decision tool or risk calculator (eg, HEART score greater than 3))    AdmissionCare documentation entered by: Tabitha Coughlin    [facility], 27th edition, Copyright © 2023 [facility], [facility] All Rights Reserved.  [id number]:00

## 2024-05-04 NOTE — ASSESSMENT & PLAN NOTE
Atyp sxs.  Trop neg x1.  EKG nonischemic.  Echo without WMA.  ACS unlikely.  Recommend repeating trop.  If neg, OK for discharge and follow up as outpatient for stress testing.

## 2024-05-04 NOTE — ED NOTES
Assumed pt care. Pt presents to ED with complaint of left intermittent anterior chest pain that radiates down left arm x3 weeks. Pt has history of heart attack per pcp. Pt alert and oriented x4. Side rails up x2 with call light within reach. Family at bedside.

## 2024-05-04 NOTE — PLAN OF CARE
[facility] - Emergency Dept  Discharge Assessment    Primary Care Provider: Julien Tabares MD   Case Management Assessment     PCP: See above  [facility]    Patient Arrived From: home with spouse  Existing Help at Home: spouse    Barriers to Discharge: none    Discharge Plan:    A. home   B. home          Discharge Assessment (most recent)       BRIEF DISCHARGE ASSESSMENT - 05/04/24 1348          Discharge Planning    Assessment Type Discharge Planning Brief Assessment     Resource/Environmental Concerns none     Support Systems Spouse/significant other     Assistance Needed none     Equipment Currently Used at Home CPAP     Current Living Arrangements home     Care Facility Name n/a     Patient/Family Anticipates Transition to home     Patient/Family Anticipated Services at Transition outpatient care   Patient to schedule f/u with PCP.  Weekend discharge.  Patient has a non-[facility] PCP.    DME Needed Upon Discharge  none     Discharge Plan A Home     Discharge Plan B Home

## 2024-05-04 NOTE — ED PROVIDER NOTES
"Encounter Date: 5/4/2024       History     Chief Complaint   Patient presents with    Chest Pain     Pt reports intermittent left anterior chest pain that radiates down his left arm, described as tightness and acc by shortness of breath, nausea and diaphoresis x3 weeks. Pt reports seeing his PCP Monday and was told he "had a heart attack at some point but not sure when". Pt reports having a cardiology appt scheduled on 5/8 but after having an episode of the pain this morning, he decided to come to the ED.      59-year-old male presents with chest pain beginning this morning.  It was substernal radiates down his left arm and he associates shortness a breath, nausea and tightness of the chest.  He additionally has leg pain which he is unsure if it is related.  No numbness or paresthesias.  He said that he had chest pain similar to this before about a week ago and saw his primary care doctor who told him "you likely had a heart attack at some point. " he was scheduled to have a cardiology appointment on the 8th however when he developed chest pain this morning he presented here.  He has a strong family history of coronary artery disease with myocardial infarction around his age.  He said that he did have a heart attack before, proximally 10 years ago, but did not undergo PCI. No paroxysmal nocturnal dyspnea, orthopnea, dyspnea on exertion, limitation in exercise tolerance, or peripheral lower extremity edema. No known prior history of requiring ventilatory or supplemental oxygen support, e.g. nasal cannula, BiPAP or CPAP either in the emergency setting or at home.        Review of patient's allergies indicates:  No Known Allergies  Past Medical History:   Diagnosis Date    Back pain     Diabetes mellitus     Hyperlipemia     Hyperlipidemia     Hypertension      No past surgical history on file.  Family History   Problem Relation Name Age of Onset    Heart disease Father       Social History     Tobacco Use    Smoking " status: Never   Substance Use Topics    Alcohol use: Yes     Comment: social    Drug use: No     Review of Systems    Physical Exam     Initial Vitals [05/04/24 0942]   BP Pulse Resp Temp SpO2   128/80 92 18 97.9 °F (36.6 °C) 97 %      MAP       --         Physical Exam    Nursing note and vitals reviewed.  Constitutional: He appears well-developed and well-nourished.   HENT:   Head: Normocephalic and atraumatic.   Eyes: EOM are normal. Pupils are equal, round, and reactive to light.   Neck: Neck supple. No JVD present.   Normal range of motion.  Cardiovascular:  Normal rate and regular rhythm.           Pulmonary/Chest: Breath sounds normal. No stridor. No respiratory distress.   Abdominal: Abdomen is soft. There is no abdominal tenderness.   Obese   Musculoskeletal:         General: No tenderness or edema. Normal range of motion.      Cervical back: Normal range of motion and neck supple.     Neurological: He is alert and oriented to person, place, and time. GCS score is 15. GCS eye subscore is 4. GCS verbal subscore is 5. GCS motor subscore is 6.   Skin: Skin is warm and dry. Capillary refill takes less than 2 seconds.   Psychiatric: He has a normal mood and affect. Thought content normal.         ED Course   Procedures  Labs Reviewed   CK - Abnormal; Notable for the following components:       Result Value     (*)     All other components within normal limits   CBC W/ AUTO DIFFERENTIAL   COMPREHENSIVE METABOLIC PANEL   TROPONIN I   B-TYPE NATRIURETIC PEPTIDE   D DIMER, QUANTITATIVE   TROPONIN I   TSH   LIPID PANEL   MAGNESIUM   CK   LIPID PANEL   MAGNESIUM   TSH   POCT GLUCOSE        ECG Results              EKG 12-lead (Final result)        Collection Time Result Time QRS Duration OHS QTC Calculation    05/04/24 09:38:00 05/04/24 16:29:24 90 425                     Final result by Interface, Lab In [facility] (05/04/24 16:29:33)                   Narrative:    Test Reason : R07.9,    Vent. Rate : 076 BPM      Atrial Rate : 076 BPM     P-R Int : 158 ms          QRS Dur : 090 ms      QT Int : 378 ms       P-R-T Axes : 069 085 038 degrees     QTc Int : 425 ms    Normal sinus rhythm with sinus arrhythmia  Low voltage QRS  Borderline Abnormal ECG  When compared with ECG of 15-DEC-2012 00:40,  No significant change was found  Confirmed by Celso Nieto MD ([id number]) on 5/4/2024 4:29:23 PM    Referred By:             Confirmed By:Celso Nieto MD                                  Imaging Results              X-Ray Chest AP Portable (Final result)  Result time 05/04/24 11:15:05      Final result by Joey Camara MD (05/04/24 11:15:05)                   Impression:      No convincing evidence of acute cardiopulmonary disease.      Electronically signed by: Joey Camara  Date:    05/04/2024  Time:    11:15               Narrative:    EXAMINATION:  XR CHEST AP PORTABLE    CLINICAL HISTORY:  Chest Pain;    TECHNIQUE:  Single frontal view of the chest was performed.    COMPARISON:  Chest radiograph performed 12/14/2012    FINDINGS:  Grossly unchanged cardiomediastinal contours, again noting mild large of the cardiac silhouette.    Lungs essentially clear.    No definite pneumothorax or large volume pleural effusion.    No acute findings in the visualized abdomen.    Osseous and soft tissue structures appear without definite acute change.                                       Medications   aspirin tablet 325 mg (325 mg Oral Given 5/4/24 1131)     Medical Decision Making  Hemodynamically stable. Afebrile. Phonating and protecting the airway spontaneously. No clinical evidence for cardiovascular instability or impending airway compromise. Examination as above. Additional historians include family at bedside. Prior medical records reviewed.  Per ED course. Current co-morbidities considered that will impact clinical decision making include as above.    Plan:  Cardiac workup, D-dimer, plan for admission due to high risk chest  pain.      Amount and/or Complexity of Data Reviewed  Labs: ordered.  Radiology: ordered.    Risk  OTC drugs.               ED Course as of 05/04/24 1714   Sat May 04, 2024   0950 Recent primary care note reviewed, history of diabetes, obesity, hyperlipidemia, hypertension.  Patient had been planned to undergo cardiology evaluation however presented today to the pain. [BG]   1057 Labs reviewed.  CBC negative.  CMP negative.  Cardiac markers negative.  D-dimer negative. [BG]      ED Course User Index  [BG] Powre Ferrari MD                           Clinical Impression:  Final diagnoses:  [R07.9] Chest pain          ED Disposition Condition    Observation                 Power Ferrari MD  05/04/24 [id number]

## 2024-05-04 NOTE — HPI
"59-year-old male presents with chest pain beginning this morning. It was substernal radiates down his left arm and he associates shortness a breath, nausea and tightness of the chest. He additionally has leg pain which he is unsure if it is related. No numbness or paresthesias. He said that he had chest pain similar to this before about a week ago and saw his primary care doctor who told him "you likely had a heart attack at some point. " he was scheduled to have a cardiology appointment on the 8th however when he developed chest pain this morning he presented here. He has a strong family history of coronary artery disease with myocardial infarction around his age. He said that he did have a heart attack before, proximally 10 years ago, but did not undergo PCI. No paroxysmal nocturnal dyspnea, orthopnea, dyspnea on exertion, limitation in exercise tolerance, or peripheral lower extremity edema. No known prior history of requiring ventilatory or supplemental oxygen support, e.g. nasal cannula, BiPAP or CPAP either in the emergency setting or at home.     Cardiology consulted for CP.    The patient is a very pleasant 59-year-old man presenting with complaints of chest discomfort.  He describes a stabbing type discomfort which is reproducible my examination.  He is currently pain-free.  EKG does not reveal any ischemic changes.  Troponin is negative x1.  Echocardiogram reveals normal LV function and wall motion.  "

## 2024-05-04 NOTE — SUBJECTIVE & OBJECTIVE
Past Medical History:   Diagnosis Date    Back pain     Diabetes mellitus     Hyperlipemia     Hyperlipidemia     Hypertension        No past surgical history on file.    Review of patient's allergies indicates:  No Known Allergies    Current Facility-Administered Medications   Medication Dose Route Frequency Provider Last Rate Last Admin    atorvastatin tablet 40 mg  40 mg Oral Daily Carin Cunninghamh, NP   40 mg at 05/04/24 1232    dextrose 10% bolus 125 mL 125 mL  12.5 g Intravenous PRN Adelaida Castro MD        glucagon (human recombinant) injection 1 mg  1 mg Intramuscular PRN Carin Cunningham NP        insulin aspart U-100 pen 0-10 Units  0-10 Units Subcutaneous Q6H PRN Carin Cunningham NP        nitroGLYCERIN SL tablet 0.4 mg  0.4 mg Sublingual Q5 Min PRN Power Ferrari MD   0.4 mg at 05/04/24 1018    sodium chloride 0.9% flush 10 mL  10 mL Intravenous PRN Carin Cunningham NP         Current Outpatient Medications   Medication Sig Dispense Refill    acyclovir 5% (ZOVIRAX) 5 % ointment Apply topically every 3 (three) hours.      aspirin 81 MG Chew Take 81 mg by mouth once daily.        hydrocodone-acetaminophen (LORTAB)  mg per tablet Take 1 tablet by mouth every 6 (six) hours as needed.        lactulose (CHRONULAC) 10 gram/15 mL solution Take by mouth 3 (three) times daily.      metformin (GLUCOPHAGE) 1000 MG tablet Take 1,000 mg by mouth 2 (two) times daily with meals.        oxycodone-acetaminophen (PERCOCET) 5-325 mg per tablet Take 1 tablet by mouth every 4 (four) hours as needed for Pain. 20 tablet 0    simvastatin (ZOCOR) 20 MG tablet Take 20 mg by mouth every evening.        valsartan (DIOVAN) 80 MG tablet Take 80 mg by mouth once daily.         Facility-Administered Medications Ordered in Other Encounters   Medication Dose Route Frequency Provider Last Rate Last Admin    [COMPLETED] perflutren protein-A microsphr 0.22 mg/mL IV susp  0.5 mL Intravenous Once Celso Nieto MD    0.11 mg at 05/04/24 1315     Family History       Problem Relation (Age of Onset)    Heart disease Father          Tobacco Use    Smoking status: Never    Smokeless tobacco: Not on file   Substance and Sexual Activity    Alcohol use: Yes     Comment: social    Drug use: No    Sexual activity: Yes     Partners: Female     Review of Systems   Constitutional: Negative for chills, diaphoresis, fever and malaise/fatigue.   HENT:  Negative for nosebleeds.    Eyes:  Negative for blurred vision and double vision.   Cardiovascular:  Positive for chest pain. Negative for claudication, cyanosis, dyspnea on exertion, leg swelling, orthopnea, palpitations, paroxysmal nocturnal dyspnea and syncope.   Respiratory:  Negative for cough, shortness of breath and wheezing.    Skin:  Negative for dry skin and poor wound healing.   Musculoskeletal:  Negative for back pain, joint swelling and myalgias.   Gastrointestinal:  Negative for abdominal pain, nausea and vomiting.   Genitourinary:  Negative for hematuria.   Neurological:  Negative for dizziness, headaches, numbness, seizures and weakness.   Psychiatric/Behavioral:  Negative for altered mental status and depression.      Objective:     Vital Signs (Most Recent):  Temp: 97.9 °F (36.6 °C) (05/04/24 0942)  Pulse: 75 (05/04/24 1133)  Resp: (!) 21 (05/04/24 1133)  BP: 103/70 (05/04/24 1133)  SpO2: 99 % (05/04/24 1133) Vital Signs (24h Range):  Temp:  [97.9 °F (36.6 °C)] 97.9 °F (36.6 °C)  Pulse:  [75-92] 75  Resp:  [18-22] 21  SpO2:  [95 %-99 %] 99 %  BP: (100-128)/(62-80) 103/70     Weight: 136.1 kg (300 lb)  Body mass index is 44.3 kg/m².    SpO2: 99 %       No intake or output data in the 24 hours ending 05/04/24 1253    Lines/Drains/Airways       Peripheral Intravenous Line  Duration                  Peripheral IV - Single Lumen 05/04/24 1002 20 G Right Antecubital <1 day                     Physical Exam  Constitutional:       General: He is not in acute distress.     Appearance:  He is well-developed. He is obese. He is not ill-appearing, toxic-appearing or diaphoretic.   HENT:      Head: Normocephalic and atraumatic.   Eyes:      General: No scleral icterus.     Extraocular Movements: Extraocular movements intact.      Conjunctiva/sclera: Conjunctivae normal.      Pupils: Pupils are equal, round, and reactive to light.   Neck:      Thyroid: No thyromegaly.      Vascular: No JVD.      Trachea: No tracheal deviation.   Cardiovascular:      Rate and Rhythm: Normal rate and regular rhythm.      Heart sounds: S1 normal and S2 normal. No murmur heard.     No friction rub. No gallop.   Pulmonary:      Effort: Pulmonary effort is normal. No respiratory distress.      Breath sounds: Normal breath sounds. No stridor. No wheezing, rhonchi or rales.   Chest:      Chest wall: Tenderness present.   Abdominal:      General: There is no distension.      Palpations: Abdomen is soft.   Musculoskeletal:         General: No swelling or tenderness. Normal range of motion.      Cervical back: Normal range of motion and neck supple. No rigidity.      Right lower leg: No edema.      Left lower leg: No edema.   Skin:     General: Skin is warm and dry.      Coloration: Skin is not jaundiced.   Neurological:      General: No focal deficit present.      Mental Status: He is alert and oriented to person, place, and time.      Cranial Nerves: No cranial nerve deficit.   Psychiatric:         Mood and Affect: Mood normal.         Behavior: Behavior normal.          Current Medications:  Current Facility-Administered Medications   Medication Dose Route Frequency    atorvastatin  40 mg Oral Daily     Current Facility-Administered Medications   Medication Dose Route Frequency Last Rate Last Admin       Current Facility-Administered Medications:     dextrose 10%, 12.5 g, Intravenous, PRN    glucagon (human recombinant), 1 mg, Intramuscular, PRN    insulin aspart U-100, 0-10 Units, Subcutaneous, Q6H PRN    nitroGLYCERIN, 0.4  mg, Sublingual, Q5 Min PRN    sodium chloride 0.9%, 10 mL, Intravenous, PRN    Laboratory (all labs reviewed):  CBC:  Recent Labs   Lab 05/04/24  1001   WBC 5.30   Hemoglobin 16.0   Hematocrit 47.6   Platelets 232       CHEMISTRIES:  Recent Labs   Lab 05/04/24  1001   Glucose 103   Sodium 140   Potassium 3.9   BUN 12   Creatinine 1.1   eGFR >60   Calcium 9.9   Magnesium 1.9       CARDIAC BIOMARKERS:  Recent Labs   Lab 05/04/24  1001    H   Troponin I <0.006       COAGS:        LIPIDS/LFTS:  Recent Labs   Lab 05/13/22  1052 04/29/24  1119 05/04/24  1001   Cholesterol 144 158 156   Triglycerides 76 87 77   HDL 46 44 42   LDL Cholesterol  --   --  98.6   LDL Calculated 86 97  --    Non-HDL Cholesterol  --  114 114   AST  --   --  18   ALT  --   --  18       BNP:  Recent Labs   Lab 05/04/24  1001   BNP <10       TSH:  Recent Labs   Lab 04/29/24  1119 05/04/24  1001   TSH 1.39 2.311       Free T4:        Diagnostic Results:  ECG (personally reviewed and interpreted tracing(s)):  5/4/24 0938 SR 76, low volt    Chest X-Ray (personally reviewed and interpreted image(s)): 5/4/24 NAD    Echo: 5/4/24 (images personally reviewed and interpreted)    Left Ventricle: The left ventricle is normal in size. Mildly increased wall thickness. There is mild concentric hypertrophy. There is normal systolic function with a visually estimated ejection fraction of 55 - 60%. There is diastolic dysfunction but grade cannot be determined.    Right Ventricle: Normal right ventricular cavity size. Systolic function is normal.    Aorta: Aortic root is mildly to moderately dilated measuring 4.22 cm.

## 2024-05-04 NOTE — DISCHARGE SUMMARY
[facility]  Hospital Medicine  Discharge Summary      Patient Name: Daniel Chapa  MRN: [id number]  SHIREEN: [id number]  Patient Class: OP- Observation  Admission Date: 5/4/2024  Hospital Length of Stay: 0 days  Discharge Date and Time:  05/04/2024 1:51 PM  Attending Physician: Adelaida Castro MD   Discharging Provider: Carin Mayberry NP  Primary Care Provider: Julien Tabares MD    Primary Care Team: CARIN MAYBERRY    HPI:   Daniel Chapa is a 58 yo male with significant history for hypertension, hyperlipidemia, diabetes type 2, CAD status post MI 10 years ago (at [facility]?) and chronic lower back pain on home Percocet who presents to the hospital for intermittent left chest wall pain described as sharp heaviness radiating down left arm associated with shortness of breath x2 weeks who presents to the ED for left-sided chest pain with shortness of breath and diaphoresis at 8:30 a.m. today.  No arm radiation today.  Chest pain improves with rest. Chest pain occurs with rest and activity. Denies fever chills cough. Family history of brother MI?  Father heart disease?.  He never smoked.  Denies drinking alcohol.  Denies illicit drugs.  Patient can not remember if he had angiogram or not? States chest pain similar to his pain when he had MI 10 yrs ago. States take ASA 81 mg daily. Patient was seen by primary 4/29 and instructed to come to the hospital if persistent chest pain. Patient have appointment with Cardiology Dr. Miranda at [facility] on 5/8.        EKG normal sinus rhythm no evidence of ischemia.  First set troponin negative.  D-dimer negative.  Chest x-ray clear.    * No surgery found *      Hospital Course:   Admission to the hospital for chest pain.  D-dimer negative.  Patient was chest pain-free with my evaluation.  EKG no evidence of ischemia and troponin trend negative.  Patient remained chest pain-free in the ED. continue aspirin statin.  2D echo showed normal EF 55-60%, diastolic dysfunction can not be  determined and normal wall motion.  Patient has follow up with Dr. Mcdonald in 4 days 05/08/2024.  Patient stable for discharge home with close follow up with cardiologist.     Goals of Care Treatment Preferences:  Code Status: Full Code      Consults:   Consults (From admission, onward)          Status Ordering Provider     Inpatient consult to Social Work  Once        Provider:  (Not yet assigned)    Acknowledged CELSO DENISE     Inpatient consult to Cardiology  Once        Provider:  Celso Denise MD    Completed MAYBERRY, MARY [facility]            No new Assessment & Plan notes have been filed under this hospital service since the last note was generated.  Service: Hospital Medicine    Final Active Diagnoses:    Diagnosis Date Noted POA    PRINCIPAL PROBLEM:  Chest pain at rest [R07.9] 05/04/2024 Yes    DM (diabetes mellitus) [E11.9] 05/04/2024 Yes    Hyperlipemia [E78.5] 05/04/2024 Yes    Class 3 severe obesity with body mass index (BMI) of 40.0 to 44.9 in adult [E66.01, Z68.41] 05/04/2024 Not Applicable    Coronary artery disease involving native coronary artery of native heart with unstable angina pectoris [I25.110] 05/04/2024 Yes    Abnormal EKG [R94.31] 05/04/2024 Yes    Aortic root dilatation [I77.810] 05/04/2024 Yes    Chronic bilateral low back pain without sciatica [M54.50, G89.29] 06/11/2018 Yes      Problems Resolved During this Admission:       Discharged Condition: stable    Disposition: Home or Self Care    Follow Up:   Follow-up Information       Julien Tabares MD. Schedule an appointment as soon as possible for a visit in 7 day(s).    Specialty: Family Medicine  Why: AVS Printed prior to this update.  Contact information:  [address] [place] [address]  [place] [address] [phone]                           Patient Instructions:      Diet diabetic     Diet Cardiac     Notify your health care provider if you experience any of the following:  temperature >100.4     Notify your health care provider  if you experience any of the following:  difficulty breathing or increased cough     Notify your health care provider if you experience any of the following:  increased confusion or weakness     Activity as tolerated       Significant Diagnostic Studies: N/A    Pending Diagnostic Studies:       None           Medications:  Reconciled Home Medications:      Medication List        CONTINUE taking these medications      acyclovir 5% 5 % ointment  Commonly known as: ZOVIRAX  Apply topically every 3 (three) hours.     aspirin 81 MG Chew  Take 81 mg by mouth once daily.     hydrocodone-acetaminophen   mg per tablet  Commonly known as: LORTAB  Take 1 tablet by mouth every 6 (six) hours as needed.     lactulose 10 gram/15 mL solution  Commonly known as: CHRONULAC  Take by mouth 3 (three) times daily.     metFORMIN 1000 MG tablet  Commonly known as: GLUCOPHAGE  Take 1,000 mg by mouth 2 (two) times daily with meals.     oxyCODONE-acetaminophen 5-325 mg per tablet  Commonly known as: PERCOCET  Take 1 tablet by mouth every 4 (four) hours as needed for Pain.     simvastatin 20 MG tablet  Commonly known as: ZOCOR  Take 20 mg by mouth every evening.     valsartan 80 MG tablet  Commonly known as: DIOVAN  Take 80 mg by mouth once daily.              Indwelling Lines/Drains at time of discharge:   Lines/Drains/Airways       None                   Time spent on the discharge of patient: 35 minutes      Carin Cunningham NP  Department of Hospital Medicine  [facility] - Emergency Dept

## 2024-05-04 NOTE — H&P
"  [facility] Medicine  History & Physical    Patient Name: Daniel Chapa  MRN: [id number]  Patient Class: OP- Observation  Admission Date: 5/4/2024  Attending Physician: Adelaida Castro MD   Primary Care Provider: Berry Avalos MD         Patient information was obtained from patient and ER records.     Subjective:     Principal Problem:Chest pain at rest    Chief Complaint:   Chief Complaint   Patient presents with    Chest Pain     Pt reports intermittent left anterior chest pain that radiates down his left arm, described as tightness and acc by shortness of breath, nausea and diaphoresis x3 weeks. Pt reports seeing his PCP Monday and was told he "had a heart attack at some point but not sure when". Pt reports having a cardiology appt scheduled on 5/8 but after having an episode of the pain this morning, he decided to come to the ED.         HPI: Daniel Chapa is a 60 yo male with significant history for hypertension, hyperlipidemia, diabetes type 2, CAD status post MI 10 years ago (at [facility]?) and chronic lower back pain on home Percocet who presents to the hospital for intermittent left chest wall pain described as sharp heaviness radiating down left arm associated with shortness of breath x2 weeks who presents to the ED for left-sided chest pain with shortness of breath and diaphoresis at 8:30 a.m. today.  No arm radiation today.  Chest pain improves with rest. Chest pain occurs with rest and activity. Denies fever chills cough. Family history of brother MI?  Father heart disease?.  He never smoked.  Denies drinking alcohol.  Denies illicit drugs.  Patient can not remember if he had angiogram or not? States chest pain similar to his pain when he had MI 10 yrs ago. States take ASA 81 mg daily. Patient was seen by primary 4/29 and instructed to come to the hospital if persistent chest pain. Patient have appointment with Cardiology Dr. Miranda at [facility] on 5/8.        EKG normal sinus rhythm no " evidence of ischemia.  First set troponin negative.  D-dimer negative.  Chest x-ray clear.    Past Medical History:   Diagnosis Date    Back pain     Diabetes mellitus     Hyperlipemia     Hyperlipidemia     Hypertension        No past surgical history on file.    Review of patient's allergies indicates:  No Known Allergies    Current Facility-Administered Medications   Medication Dose Route Frequency Provider Last Rate Last Admin    atorvastatin tablet 40 mg  40 mg Oral Daily Marisa Carin Marty, NP   40 mg at 05/04/24 1232    dextrose 10% bolus 125 mL 125 mL  12.5 g Intravenous PRN Adelaida Castro MD        glucagon (human recombinant) injection 1 mg  1 mg Intramuscular PRN Carin Cunningham NP        insulin aspart U-100 pen 0-10 Units  0-10 Units Subcutaneous Q6H PRN Carin Cunningham NP        nitroGLYCERIN SL tablet 0.4 mg  0.4 mg Sublingual Q5 Min PRN Power Ferrari MD   0.4 mg at 05/04/24 1018    sodium chloride 0.9% flush 10 mL  10 mL Intravenous PRN Carin Cunningham NP         Current Outpatient Medications   Medication Sig Dispense Refill    acyclovir 5% (ZOVIRAX) 5 % ointment Apply topically every 3 (three) hours.      aspirin 81 MG Chew Take 81 mg by mouth once daily.        hydrocodone-acetaminophen (LORTAB)  mg per tablet Take 1 tablet by mouth every 6 (six) hours as needed.        lactulose (CHRONULAC) 10 gram/15 mL solution Take by mouth 3 (three) times daily.      metformin (GLUCOPHAGE) 1000 MG tablet Take 1,000 mg by mouth 2 (two) times daily with meals.        oxycodone-acetaminophen (PERCOCET) 5-325 mg per tablet Take 1 tablet by mouth every 4 (four) hours as needed for Pain. 20 tablet 0    simvastatin (ZOCOR) 20 MG tablet Take 20 mg by mouth every evening.        valsartan (DIOVAN) 80 MG tablet Take 80 mg by mouth once daily.         Facility-Administered Medications Ordered in Other Encounters   Medication Dose Route Frequency Provider Last Rate Last Admin    [COMPLETED]  perflutren protein-A microsphr 0.22 mg/mL IV susp  0.5 mL Intravenous Once Celso Nieto MD   0.11 mg at 05/04/24 1315     Family History       Problem Relation (Age of Onset)    Heart disease Father          Tobacco Use    Smoking status: Never    Smokeless tobacco: Not on file   Substance and Sexual Activity    Alcohol use: Yes     Comment: social    Drug use: No    Sexual activity: Yes     Partners: Female     Review of Systems   Constitutional:  Positive for activity change and diaphoresis. Negative for fatigue and fever.   HENT: Negative.     Respiratory:  Positive for chest tightness and shortness of breath.    Gastrointestinal: Negative.    Genitourinary: Negative.    Musculoskeletal:  Positive for back pain.   Skin: Negative.    Neurological: Negative.    Hematological: Negative.    Psychiatric/Behavioral: Negative.       Objective:     Vital Signs (Most Recent):  Temp: 97.9 °F (36.6 °C) (05/04/24 0942)  Pulse: 75 (05/04/24 1133)  Resp: (!) 21 (05/04/24 1133)  BP: 103/70 (05/04/24 1133)  SpO2: 99 % (05/04/24 1133) Vital Signs (24h Range):  Temp:  [97.9 °F (36.6 °C)] 97.9 °F (36.6 °C)  Pulse:  [75-92] 75  Resp:  [18-22] 21  SpO2:  [95 %-99 %] 99 %  BP: (100-128)/(62-80) 103/70     Weight: 136.1 kg (300 lb)  Body mass index is 44.3 kg/m².     Physical Exam  Constitutional:       Appearance: Normal appearance. He is obese. He is not ill-appearing.   HENT:      Head: Atraumatic.      Mouth/Throat:      Mouth: Mucous membranes are dry.   Cardiovascular:      Rate and Rhythm: Normal rate and regular rhythm.      Pulses: Normal pulses.      Heart sounds: Normal heart sounds.   Pulmonary:      Breath sounds: Normal breath sounds.   Musculoskeletal:      Right lower leg: No edema.      Left lower leg: No edema.   Neurological:      General: No focal deficit present.      Mental Status: He is alert. Mental status is at baseline.                Significant Labs: All pertinent labs within the past 24 hours have  been reviewed.    Significant Imaging: I have reviewed all pertinent imaging results/findings within the past 24 hours.  Assessment/Plan:     * Chest pain at rest  History of CAD status post MI 10 years ago?? who presents to ED for intermittent left chest wall pain described as sharp heavy for the last 2 weeks.  Symptoms sometimes occur with shortness a breath and left arm pain.  This a.m. patient woke up at 8:30 a.m. with left chest wall pain associated with shortness a breath and diaphoretic  Patient is poor historian  EKG no evidence of ischemia and 1st set of troponin negative  D-dimer negative, TSH okay LDL 98.6   Patient received nitro with improvement in symptoms.  Currently patient is chest pain-free  2D echo showed EF 55-60%, diastolic dysfunction can not be determined, normal wall motion  Aspirin statin  Trend troponin  Cardiology consult    Coronary artery disease involving native coronary artery of native heart with unstable angina pectoris  See above    Class 3 severe obesity with body mass index (BMI) of 40.0 to 44.9 in adult  Body mass index is 44.3 kg/m². Morbid obesity complicates all aspects of disease management from diagnostic modalities to treatment. Weight loss encouraged and health benefits explained to patient.           Hyperlipemia  Lab Results   Component Value Date    LDLCALC 98.6 05/04/2024   Continue statin        DM (diabetes mellitus)  Patient's FSGs are controlled on current medication regimen.  Last A1c reviewed-   Lab Results   Component Value Date    HGBA1C 5.9 (H) 04/29/2024     Most recent fingerstick glucose reviewed-   Recent Labs   Lab 05/04/24  1232   POCTGLUCOSE 80     Current correctional scale  Low  Maintain anti-hyperglycemic dose as follows-   Antihyperglycemics (From admission, onward)      Start     Stop Route Frequency Ordered    05/04/24 1254  insulin aspart U-100 pen 0-10 Units         -- SubQ Every 6 hours PRN 05/04/24 1154          Hold Oral hypoglycemics  while patient is in the hospital.    Chronic bilateral low back pain without sciatica  Continue home percocet        VTE Risk Mitigation (From admission, onward)           Ordered     Place DELGADO hose  Until discontinued         05/04/24 1307                         On 05/04/2024, patient should be placed in hospital observation services under my care in collaboration with Adelaida Castro MD.      AdmissionCare    Guideline: Chest Pain - OBS, Observation    Based on the indications selected for the patient, the bed status of Observation was determined to be MET    The following indications were selected as present at the time of evaluation of the patient:      - Chest pain (or other anginal equivalent) not classified as low risk for acute coronary syndrome, as indicated by 1 or more of the following:    - Patient classified as intermediate risk or high risk for acute coronary syndrome (eg, via use of a clinical decision tool or risk calculator (eg, HEART score greater than 3))    AdmissionCare documentation entered by: Tabitha Coughlin    [facility], 27th edition, Copyright © 2023 [facility], [facility] All Rights Reserved.  [id number]:49:13-05:00    Carin Cunningham NP  Department of Hospital Medicine  [facility] [place]

## 2024-05-04 NOTE — PLAN OF CARE
[facility] - Emergency Dept  Discharge Final Note    Primary Care Provider: Julien Tabares MD  Case Management Final Discharge Note      Discharge Disposition: Home    New DME ordered / company name: none    Relevant SDOH / Transition of Care Barriers:  none    Primary Caretaker and contact information: self    Scheduled followup appointment: Patient to schedule f/u with his PCP.  AVS printed prior to SW contact with patient.  PCP updated in Psychiatric.    Referrals placed: n/a    Transportation: Self/family        Patient and family educated on discharge services and updated on DC plan. Bedside RN notified, patient clear to discharge from Case Management Perspective.     Expected Discharge Date: 5/4/2024    Final Discharge Note (most recent)       Final Note - 05/04/24 1350          Final Note    Assessment Type Final Discharge Note     Anticipated Discharge Disposition Home or Self Care     What phone number can be called within the next 1-3 days to see how you are doing after discharge? [phone]     Hospital Resources/Appts/Education Provided Post-Acute resouces added to AVS        Post-Acute Status    Discharge Delays None known at this time                     Important Message from Medicare             Contact Info       Julien Tabares MD   Specialty: Family Medicine   Relationship: PCP - General    [address] [place] [address]  [place] [address]   Phone: [phone]       Next Steps: Schedule an appointment as soon as possible for a visit in 7 day(s)    Instructions: AVS Printed prior to this update.

## 2024-05-04 NOTE — ASSESSMENT & PLAN NOTE
Body mass index is 44.3 kg/m². Morbid obesity complicates all aspects of disease management from diagnostic modalities to treatment. Weight loss encouraged and health benefits explained to patient.

## 2024-05-04 NOTE — ASSESSMENT & PLAN NOTE
Lab Results   Component Value Date    LDLCALC 98.6 05/04/2024   Continue statin       all other ROS negative except as per HPI

## 2024-05-04 NOTE — PLAN OF CARE
Follow-up Information       Julien Tabares MD. Schedule an appointment as soon as possible for a visit in 7 day(s).    Specialty: Family Medicine  Why: AVS Printed prior to this update.  Contact information:  [address] [facility] [address] [phone]               Celso Nieto MD. Schedule an appointment as soon as possible for a visit in 6 week(s).    Specialties: Cardiology, Interventional Cardiology  Why: Call to schedule an appointment to be seen in 6 weeks.  Contact information:  [address]  [address]  [facility] [address] [phone]                           Patient provide with this page as AVS was printed prior to SW f/u with patient.

## 2024-05-04 NOTE — CONSULTS
"[facility] Emergency Dept  Cardiology  Consult Note    Patient Name: Daniel Chapa  MRN: [id number]  Admission Date: 5/4/2024  Hospital Length of Stay: 0 days  Code Status: Full Code   Attending Provider: Adelaida Castro MD   Consulting Provider: Celso Nieto MD  Primary Care Physician: Berry Avalos MD  Principal Problem:Chest pain at rest    Patient information was obtained from patient and ER records.     Inpatient consult to Cardiology  Consult performed by: Celso Nieto MD  Consult ordered by: Carin Cunningham NP  Reason for consult: CP        Subjective:     Chief Complaint:  CP     HPI:   59-year-old male presents with chest pain beginning this morning. It was substernal radiates down his left arm and he associates shortness a breath, nausea and tightness of the chest. He additionally has leg pain which he is unsure if it is related. No numbness or paresthesias. He said that he had chest pain similar to this before about a week ago and saw his primary care doctor who told him "you likely had a heart attack at some point. " he was scheduled to have a cardiology appointment on the 8th however when he developed chest pain this morning he presented here. He has a strong family history of coronary artery disease with myocardial infarction around his age. He said that he did have a heart attack before, proximally 10 years ago, but did not undergo PCI. No paroxysmal nocturnal dyspnea, orthopnea, dyspnea on exertion, limitation in exercise tolerance, or peripheral lower extremity edema. No known prior history of requiring ventilatory or supplemental oxygen support, e.g. nasal cannula, BiPAP or CPAP either in the emergency setting or at home.     Cardiology consulted for CP.    The patient is a very pleasant 59-year-old man presenting with complaints of chest discomfort.  He describes a stabbing type discomfort which is reproducible my examination.  He is currently pain-free.  EKG does not reveal any " ischemic changes.  Troponin is negative x1.  Echocardiogram reveals normal LV function and wall motion.    Past Medical History:   Diagnosis Date    Back pain     Diabetes mellitus     Hyperlipemia     Hyperlipidemia     Hypertension        No past surgical history on file.    Review of patient's allergies indicates:  No Known Allergies    Current Facility-Administered Medications   Medication Dose Route Frequency Provider Last Rate Last Admin    atorvastatin tablet 40 mg  40 mg Oral Daily Carin Cunningham NP   40 mg at 05/04/24 1232    dextrose 10% bolus 125 mL 125 mL  12.5 g Intravenous PRN Adeliada Castro MD        glucagon (human recombinant) injection 1 mg  1 mg Intramuscular PRN Carin Cunningham NP        insulin aspart U-100 pen 0-10 Units  0-10 Units Subcutaneous Q6H PRN Carin Cunningham NP        nitroGLYCERIN SL tablet 0.4 mg  0.4 mg Sublingual Q5 Min PRN Power Ferrari MD   0.4 mg at 05/04/24 1018    sodium chloride 0.9% flush 10 mL  10 mL Intravenous PRN Carin Cunningham NP         Current Outpatient Medications   Medication Sig Dispense Refill    acyclovir 5% (ZOVIRAX) 5 % ointment Apply topically every 3 (three) hours.      aspirin 81 MG Chew Take 81 mg by mouth once daily.        hydrocodone-acetaminophen (LORTAB)  mg per tablet Take 1 tablet by mouth every 6 (six) hours as needed.        lactulose (CHRONULAC) 10 gram/15 mL solution Take by mouth 3 (three) times daily.      metformin (GLUCOPHAGE) 1000 MG tablet Take 1,000 mg by mouth 2 (two) times daily with meals.        oxycodone-acetaminophen (PERCOCET) 5-325 mg per tablet Take 1 tablet by mouth every 4 (four) hours as needed for Pain. 20 tablet 0    simvastatin (ZOCOR) 20 MG tablet Take 20 mg by mouth every evening.        valsartan (DIOVAN) 80 MG tablet Take 80 mg by mouth once daily.         Facility-Administered Medications Ordered in Other Encounters   Medication Dose Route Frequency Provider Last Rate Last Admin     [COMPLETED] perflutren protein-A microsphr 0.22 mg/mL IV susp  0.5 mL Intravenous Once Celso Nieto MD   0.11 mg at 05/04/24 1315     Family History       Problem Relation (Age of Onset)    Heart disease Father          Tobacco Use    Smoking status: Never    Smokeless tobacco: Not on file   Substance and Sexual Activity    Alcohol use: Yes     Comment: social    Drug use: No    Sexual activity: Yes     Partners: Female     Review of Systems   Constitutional: Negative for chills, diaphoresis, fever and malaise/fatigue.   HENT:  Negative for nosebleeds.    Eyes:  Negative for blurred vision and double vision.   Cardiovascular:  Positive for chest pain. Negative for claudication, cyanosis, dyspnea on exertion, leg swelling, orthopnea, palpitations, paroxysmal nocturnal dyspnea and syncope.   Respiratory:  Negative for cough, shortness of breath and wheezing.    Skin:  Negative for dry skin and poor wound healing.   Musculoskeletal:  Negative for back pain, joint swelling and myalgias.   Gastrointestinal:  Negative for abdominal pain, nausea and vomiting.   Genitourinary:  Negative for hematuria.   Neurological:  Negative for dizziness, headaches, numbness, seizures and weakness.   Psychiatric/Behavioral:  Negative for altered mental status and depression.      Objective:     Vital Signs (Most Recent):  Temp: 97.9 °F (36.6 °C) (05/04/24 0942)  Pulse: 75 (05/04/24 1133)  Resp: (!) 21 (05/04/24 1133)  BP: 103/70 (05/04/24 1133)  SpO2: 99 % (05/04/24 1133) Vital Signs (24h Range):  Temp:  [97.9 °F (36.6 °C)] 97.9 °F (36.6 °C)  Pulse:  [75-92] 75  Resp:  [18-22] 21  SpO2:  [95 %-99 %] 99 %  BP: (100-128)/(62-80) 103/70     Weight: 136.1 kg (300 lb)  Body mass index is 44.3 kg/m².    SpO2: 99 %       No intake or output data in the 24 hours ending 05/04/24 1253    Lines/Drains/Airways       Peripheral Intravenous Line  Duration                  Peripheral IV - Single Lumen 05/04/24 1002 20 G Right Antecubital <1 day                      Physical Exam  Constitutional:       General: He is not in acute distress.     Appearance: He is well-developed. He is obese. He is not ill-appearing, toxic-appearing or diaphoretic.   HENT:      Head: Normocephalic and atraumatic.   Eyes:      General: No scleral icterus.     Extraocular Movements: Extraocular movements intact.      Conjunctiva/sclera: Conjunctivae normal.      Pupils: Pupils are equal, round, and reactive to light.   Neck:      Thyroid: No thyromegaly.      Vascular: No JVD.      Trachea: No tracheal deviation.   Cardiovascular:      Rate and Rhythm: Normal rate and regular rhythm.      Heart sounds: S1 normal and S2 normal. No murmur heard.     No friction rub. No gallop.   Pulmonary:      Effort: Pulmonary effort is normal. No respiratory distress.      Breath sounds: Normal breath sounds. No stridor. No wheezing, rhonchi or rales.   Chest:      Chest wall: Tenderness present.   Abdominal:      General: There is no distension.      Palpations: Abdomen is soft.   Musculoskeletal:         General: No swelling or tenderness. Normal range of motion.      Cervical back: Normal range of motion and neck supple. No rigidity.      Right lower leg: No edema.      Left lower leg: No edema.   Skin:     General: Skin is warm and dry.      Coloration: Skin is not jaundiced.   Neurological:      General: No focal deficit present.      Mental Status: He is alert and oriented to person, place, and time.      Cranial Nerves: No cranial nerve deficit.   Psychiatric:         Mood and Affect: Mood normal.         Behavior: Behavior normal.          Current Medications:  Current Facility-Administered Medications   Medication Dose Route Frequency    atorvastatin  40 mg Oral Daily     Current Facility-Administered Medications   Medication Dose Route Frequency Last Rate Last Admin       Current Facility-Administered Medications:     dextrose 10%, 12.5 g, Intravenous, PRN    glucagon (human  recombinant), 1 mg, Intramuscular, PRN    insulin aspart U-100, 0-10 Units, Subcutaneous, Q6H PRN    nitroGLYCERIN, 0.4 mg, Sublingual, Q5 Min PRN    sodium chloride 0.9%, 10 mL, Intravenous, PRN    Laboratory (all labs reviewed):  CBC:  Recent Labs   Lab 05/04/24  1001   WBC 5.30   Hemoglobin 16.0   Hematocrit 47.6   Platelets 232       CHEMISTRIES:  Recent Labs   Lab 05/04/24  1001   Glucose 103   Sodium 140   Potassium 3.9   BUN 12   Creatinine 1.1   eGFR >60   Calcium 9.9   Magnesium 1.9       CARDIAC BIOMARKERS:  Recent Labs   Lab 05/04/24  1001    H   Troponin I <0.006       COAGS:        LIPIDS/LFTS:  Recent Labs   Lab 05/13/22  1052 04/29/24  1119 05/04/24  1001   Cholesterol 144 158 156   Triglycerides 76 87 77   HDL 46 44 42   LDL Cholesterol  --   --  98.6   LDL Calculated 86 97  --    Non-HDL Cholesterol  --  114 114   AST  --   --  18   ALT  --   --  18       BNP:  Recent Labs   Lab 05/04/24  1001   BNP <10       TSH:  Recent Labs   Lab 04/29/24  1119 05/04/24  1001   TSH 1.39 2.311       Free T4:        Diagnostic Results:  ECG (personally reviewed and interpreted tracing(s)):  5/4/24 0938 SR 76, low volt    Chest X-Ray (personally reviewed and interpreted image(s)): 5/4/24 NAD    Echo: 5/4/24 (images personally reviewed and interpreted)    Left Ventricle: The left ventricle is normal in size. Mildly increased wall thickness. There is mild concentric hypertrophy. There is normal systolic function with a visually estimated ejection fraction of 55 - 60%. There is diastolic dysfunction but grade cannot be determined.    Right Ventricle: Normal right ventricular cavity size. Systolic function is normal.    Aorta: Aortic root is mildly to moderately dilated measuring 4.22 cm.        Assessment and Plan:     * Chest pain at rest  Atyp sxs.  Trop neg x1.  EKG nonischemic.  Echo without WMA.  ACS unlikely.  Recommend repeating trop.  If neg, OK for discharge and follow up as outpatient for stress  testing.    Abnormal EKG  Low volt with mild LVH on echo.  No sxs of CHF.  Doubt infiltrative CMP.    Aortic root dilatation  4.2 cm by echo 5/2024.  Repeat in 6 months (Nov 2024)        VTE Risk Mitigation (From admission, onward)           Ordered     Place DELGADO hose  Until discontinued         05/04/24 1307                  Assuming neg second trop, OK for discharge and follow up with me as outpatient.    Thank you for your consult. I will follow-up with patient. Please contact us if you have any additional questions.    Celso Nieto MD  Cardiology   [facility] [place] - Emergency Dept

## 2024-05-04 NOTE — CONSULTS
Patient discharged over weekend.  SW sent a staff message to Dr. Nieto's office advising of need for a 6 week followup appt.  Request for office to contact patient with date and time of appointment submitted.    Patient also provided with contact number for Dr. Nieto's office.